# Patient Record
Sex: FEMALE | Race: WHITE | NOT HISPANIC OR LATINO | Employment: FULL TIME | URBAN - METROPOLITAN AREA
[De-identification: names, ages, dates, MRNs, and addresses within clinical notes are randomized per-mention and may not be internally consistent; named-entity substitution may affect disease eponyms.]

---

## 2017-06-05 ENCOUNTER — TELEPHONE (OUTPATIENT)
Dept: FAMILY MEDICINE | Facility: CLINIC | Age: 52
End: 2017-06-05

## 2017-06-05 ENCOUNTER — PATIENT MESSAGE (OUTPATIENT)
Dept: ADMINISTRATIVE | Facility: HOSPITAL | Age: 52
End: 2017-06-05

## 2017-06-05 DIAGNOSIS — T75.3XXA MOTION SICKNESS, INITIAL ENCOUNTER: Primary | ICD-10-CM

## 2017-06-05 NOTE — TELEPHONE ENCOUNTER
----- Message from Catarina Sandoval sent at 6/5/2017 10:37 AM CDT -----  Contact: [phone]  RX request - refill or new RX.  Is this a refill or new RX:    RX name and strength: motion sickness patch   Directions:   Is this a 30 day or 90 day RX:    Pharmacy name and phone #: [email or web address]  [phone]  Comments: going on cruise on June 18th

## 2017-06-06 RX ORDER — SCOLOPAMINE TRANSDERMAL SYSTEM 1 MG/1
1 PATCH, EXTENDED RELEASE TRANSDERMAL
Qty: 4 PATCH | Refills: 1 | Status: SHIPPED | OUTPATIENT
Start: 2017-06-06 | End: 2019-06-27

## 2017-07-03 ENCOUNTER — PATIENT MESSAGE (OUTPATIENT)
Dept: ADMINISTRATIVE | Facility: HOSPITAL | Age: 52
End: 2017-07-03

## 2017-09-27 ENCOUNTER — NURSE TRIAGE (OUTPATIENT)
Dept: ADMINISTRATIVE | Facility: CLINIC | Age: 52
End: 2017-09-27

## 2017-09-27 NOTE — TELEPHONE ENCOUNTER
Reason for Disposition   Patient wants to be seen    Protocols used: ST INSECT BITE-A-OH    Patient has a possible caterpillar or wasp sting on left hand. She states area is red with very itchy.

## 2018-01-25 ENCOUNTER — PATIENT MESSAGE (OUTPATIENT)
Dept: FAMILY MEDICINE | Facility: CLINIC | Age: 53
End: 2018-01-25

## 2018-10-04 ENCOUNTER — TELEPHONE (OUTPATIENT)
Dept: FAMILY MEDICINE | Facility: CLINIC | Age: 53
End: 2018-10-04

## 2018-10-04 NOTE — TELEPHONE ENCOUNTER
Attempted to call patient to schedule an appointment per her request via email. Left message that next available annual was in December but if patient was not feeling good we could make a urgent appointment now and schedule an annual down the line.

## 2019-04-08 ENCOUNTER — TELEPHONE (OUTPATIENT)
Dept: FAMILY MEDICINE | Facility: CLINIC | Age: 54
End: 2019-04-08

## 2019-04-08 DIAGNOSIS — Z00.00 ROUTINE GENERAL MEDICAL EXAMINATION AT A HEALTH CARE FACILITY: Primary | ICD-10-CM

## 2019-06-13 ENCOUNTER — PATIENT OUTREACH (OUTPATIENT)
Dept: ADMINISTRATIVE | Facility: HOSPITAL | Age: 54
End: 2019-06-13

## 2019-06-13 NOTE — LETTER
June 13, 2019    [facility] Records Release Dept             [facility]  [address]  [facility] [address]  Phone: [phone] June 13, 2019     Patient: Brianna Devine    YOB: 1965   Date of Visit: 6/13/2019         [facility]    We are seeing Brianna Devine in the clinic today at [facility].  Janneth Mckeon MD is their PCP.  She/He has an outstanding lab/procedure at this time when reviewing their chart.  To help with our Health Maintenance records will you please supply the following:      Mammogram Results   Pap Test Results                                       Please Fax to [facility] at [phone]    Thank you for your help,     Jayne Stanford MA  Panel Care Coordinator  [facility]  [phone] phone  [phone] fax  [email or web address]

## 2019-06-26 ENCOUNTER — LAB VISIT (OUTPATIENT)
Dept: LAB | Facility: HOSPITAL | Age: 54
End: 2019-06-26
Attending: INTERNAL MEDICINE
Payer: COMMERCIAL

## 2019-06-26 DIAGNOSIS — Z00.00 ROUTINE GENERAL MEDICAL EXAMINATION AT A HEALTH CARE FACILITY: ICD-10-CM

## 2019-06-26 LAB
ALBUMIN SERPL BCP-MCNC: 3.8 G/DL (ref 3.5–5.2)
ALP SERPL-CCNC: 75 U/L (ref 55–135)
ALT SERPL W/O P-5'-P-CCNC: 21 U/L (ref 10–44)
ANION GAP SERPL CALC-SCNC: 8 MMOL/L (ref 8–16)
AST SERPL-CCNC: 26 U/L (ref 10–40)
BASOPHILS # BLD AUTO: 0.02 K/UL (ref 0–0.2)
BASOPHILS NFR BLD: 0.3 % (ref 0–1.9)
BILIRUB SERPL-MCNC: 0.6 MG/DL (ref 0.1–1)
BUN SERPL-MCNC: 17 MG/DL (ref 6–20)
CALCIUM SERPL-MCNC: 9.7 MG/DL (ref 8.7–10.5)
CHLORIDE SERPL-SCNC: 105 MMOL/L (ref 95–110)
CHOLEST SERPL-MCNC: 185 MG/DL (ref 120–199)
CHOLEST/HDLC SERPL: 2.6 {RATIO} (ref 2–5)
CO2 SERPL-SCNC: 27 MMOL/L (ref 23–29)
CREAT SERPL-MCNC: 0.8 MG/DL (ref 0.5–1.4)
DIFFERENTIAL METHOD: ABNORMAL
EOSINOPHIL # BLD AUTO: 0.1 K/UL (ref 0–0.5)
EOSINOPHIL NFR BLD: 1.9 % (ref 0–8)
ERYTHROCYTE [DISTWIDTH] IN BLOOD BY AUTOMATED COUNT: 14.4 % (ref 11.5–14.5)
EST. GFR  (AFRICAN AMERICAN): >60 ML/MIN/1.73 M^2
EST. GFR  (NON AFRICAN AMERICAN): >60 ML/MIN/1.73 M^2
GLUCOSE SERPL-MCNC: 95 MG/DL (ref 70–110)
HCT VFR BLD AUTO: 41.2 % (ref 37–48.5)
HDLC SERPL-MCNC: 72 MG/DL (ref 40–75)
HDLC SERPL: 38.9 % (ref 20–50)
HGB BLD-MCNC: 12.9 G/DL (ref 12–16)
IMM GRANULOCYTES # BLD AUTO: 0.01 K/UL (ref 0–0.04)
IMM GRANULOCYTES NFR BLD AUTO: 0.2 % (ref 0–0.5)
LDLC SERPL CALC-MCNC: 99.8 MG/DL (ref 63–159)
LYMPHOCYTES # BLD AUTO: 2.2 K/UL (ref 1–4.8)
LYMPHOCYTES NFR BLD: 36.7 % (ref 18–48)
MCH RBC QN AUTO: 28.7 PG (ref 27–31)
MCHC RBC AUTO-ENTMCNC: 31.3 G/DL (ref 32–36)
MCV RBC AUTO: 92 FL (ref 82–98)
MONOCYTES # BLD AUTO: 0.4 K/UL (ref 0.3–1)
MONOCYTES NFR BLD: 6.1 % (ref 4–15)
NEUTROPHILS # BLD AUTO: 3.3 K/UL (ref 1.8–7.7)
NEUTROPHILS NFR BLD: 54.8 % (ref 38–73)
NONHDLC SERPL-MCNC: 113 MG/DL
NRBC BLD-RTO: 0 /100 WBC
PLATELET # BLD AUTO: 251 K/UL (ref 150–350)
PMV BLD AUTO: 10.5 FL (ref 9.2–12.9)
POTASSIUM SERPL-SCNC: 4.2 MMOL/L (ref 3.5–5.1)
PROT SERPL-MCNC: 7.3 G/DL (ref 6–8.4)
RBC # BLD AUTO: 4.5 M/UL (ref 4–5.4)
SODIUM SERPL-SCNC: 140 MMOL/L (ref 136–145)
TRIGL SERPL-MCNC: 66 MG/DL (ref 30–150)
WBC # BLD AUTO: 5.92 K/UL (ref 3.9–12.7)

## 2019-06-26 PROCEDURE — 80061 LIPID PANEL: CPT

## 2019-06-26 PROCEDURE — 85025 COMPLETE CBC W/AUTO DIFF WBC: CPT

## 2019-06-26 PROCEDURE — 36415 COLL VENOUS BLD VENIPUNCTURE: CPT | Mod: PN

## 2019-06-26 PROCEDURE — 80053 COMPREHEN METABOLIC PANEL: CPT

## 2019-06-27 ENCOUNTER — OFFICE VISIT (OUTPATIENT)
Dept: PRIMARY CARE CLINIC | Facility: CLINIC | Age: 54
End: 2019-06-27
Payer: COMMERCIAL

## 2019-06-27 VITALS
SYSTOLIC BLOOD PRESSURE: 110 MMHG | DIASTOLIC BLOOD PRESSURE: 66 MMHG | WEIGHT: 176.38 LBS | HEIGHT: 65 IN | TEMPERATURE: 98 F | HEART RATE: 73 BPM | BODY MASS INDEX: 29.38 KG/M2

## 2019-06-27 DIAGNOSIS — F41.9 ANXIETY: ICD-10-CM

## 2019-06-27 DIAGNOSIS — G47.9 DIFFICULTY SLEEPING: ICD-10-CM

## 2019-06-27 DIAGNOSIS — Z86.19 HISTORY OF SHINGLES: ICD-10-CM

## 2019-06-27 DIAGNOSIS — R23.2 HOT FLASHES: ICD-10-CM

## 2019-06-27 DIAGNOSIS — Z00.00 ANNUAL PHYSICAL EXAM: Primary | ICD-10-CM

## 2019-06-27 PROCEDURE — 99999 PR PBB SHADOW E&M-EST. PATIENT-LVL III: ICD-10-PCS | Mod: PBBFAC,,, | Performed by: INTERNAL MEDICINE

## 2019-06-27 PROCEDURE — 99396 PREV VISIT EST AGE 40-64: CPT | Mod: S$GLB,,, | Performed by: INTERNAL MEDICINE

## 2019-06-27 PROCEDURE — 99999 PR PBB SHADOW E&M-EST. PATIENT-LVL III: CPT | Mod: PBBFAC,,, | Performed by: INTERNAL MEDICINE

## 2019-06-27 PROCEDURE — 99396 PR PREVENTIVE VISIT,EST,40-64: ICD-10-PCS | Mod: S$GLB,,, | Performed by: INTERNAL MEDICINE

## 2019-06-27 RX ORDER — HYDROXYZINE HYDROCHLORIDE 25 MG/1
TABLET, FILM COATED ORAL
Qty: 30 TABLET | Refills: 5 | Status: SHIPPED | OUTPATIENT
Start: 2019-06-27 | End: 2021-10-12

## 2019-06-27 NOTE — PROGRESS NOTES
Subjective:        Patient ID: Brianna Devine is a 54 y.o. female.    Chief Complaint: Annual Exam; Shoulder Pain; and Cyst (back of right knee)    HPI   Brianna Devine presents for annual exam.    Pt had her legs ultrasounded by vascular and was told she has a cyst in the back of the right knee.    Pt does jazzercise regularly.  She noted some discomfort in the right shoulder when abducting with weights (usually uses 5#), no discomfort without weights.    Pt has not been able to lose weight despite regular exercise and trying to eat small portions, healthy diet.  She endorses having some difficulty falling asleep and staying asleep.  She takes melatonin 3mg which helps with falling asleep but she will wake up in the middle of the night and her mind will start racing thinking about everything she needs to do, work.    Review of Systems   Constitutional: Negative for activity change and unexpected weight change.   HENT: Negative for hearing loss, rhinorrhea and trouble swallowing.    Eyes: Negative for discharge and visual disturbance.   Respiratory: Negative for chest tightness, shortness of breath and wheezing.    Cardiovascular: Negative for chest pain, palpitations and leg swelling.   Gastrointestinal: Negative for abdominal pain, blood in stool, constipation, diarrhea and vomiting.   Endocrine: Negative for polydipsia and polyuria.   Genitourinary: Negative for difficulty urinating, dysuria, hematuria, menstrual problem, vaginal bleeding and vaginal discharge.   Musculoskeletal: Positive for arthralgias. Negative for joint swelling and neck pain.   Neurological: Negative for weakness and headaches.   Psychiatric/Behavioral: Positive for sleep disturbance (sometimes). Negative for confusion and dysphoric mood.           Objective:        Vitals:    06/27/19 1308   BP: 110/66   Pulse: 73   Temp: 98 °F (36.7 °C)     Physical Exam   Constitutional: She is oriented to person, place, and time. She  appears well-developed and well-nourished. No distress.   HENT:   Head: Normocephalic and atraumatic.   Right Ear: External ear normal.   Left Ear: External ear normal.   Nose: Nose normal.   - bilateral ear canals clear, tympanic membranes visualized - normal color and light reflex   Eyes: Conjunctivae and EOM are normal.   Neck: Normal range of motion. Neck supple. No thyromegaly present.   Cardiovascular: Normal rate, regular rhythm and normal heart sounds.   No murmur heard.  Pulmonary/Chest: Effort normal and breath sounds normal. No respiratory distress.   Abdominal: Soft. She exhibits no distension. There is no tenderness. There is no guarding.   Musculoskeletal: Normal range of motion. She exhibits no edema or deformity (unable to appreciate cyst or other abnormality in posterior R knee).   Lymphadenopathy:     She has no cervical adenopathy.   Neurological: She is alert and oriented to person, place, and time.   Skin: Skin is warm and dry.   Psychiatric: She has a normal mood and affect. Her behavior is normal. Judgment and thought content normal.   Vitals reviewed.        Most recent lab results reviewed with patient.    The 10-year ASCVD risk score ([place] DC Jr., et al., 2013) is: 1.2%    Values used to calculate the score:      Age: 54 years      Sex: Female      Is Non- : No      Diabetic: No      Tobacco smoker: No      Systolic Blood Pressure: 110 mmHg      Is BP treated: No      HDL Cholesterol: 72 mg/dL      Total Cholesterol: 185 mg/dL    Assessment:         1. Annual physical exam    2. History of shingles    3. Hot flashes    4. Anxiety    5. Difficulty sleeping              Plan:         Brianna was seen today for annual exam, shoulder pain and cyst.    Diagnoses and all orders for this visit:    Annual physical exam  - Recommended Shingrix vaccine.  Check coverage with insurance first, then get vaccine from local pharmacy.  - mammo, DXA ordered by pt's gyn    History of  shingles    Hot flashes: No HRT recommended given famhx of CA; advised pt there are non hormonal therapies available to treat vasomotor sx if they are severe or significantly affecting her QoL    Anxiety  Difficulty sleeping: Discussed continuing with exercise to cope with stress.  Can also try mindful breathing, yoga before bedtime to clear mind; reviewed good sleep hygiene.  Continue melatonin PRN.  Try Hydroxyzine PRN for severe anxiety and insomnia.  -     hydrOXYzine HCl (ATARAX) 25 MG tablet; Take 1/2 to 1 tab by mouth every 8 hours as needed for anxiety and difficulty sleeping.        Follow up in about 1 year (around 6/27/2020) for annual exam and labs.

## 2021-04-16 ENCOUNTER — PATIENT MESSAGE (OUTPATIENT)
Dept: RESEARCH | Facility: HOSPITAL | Age: 56
End: 2021-04-16

## 2021-09-28 ENCOUNTER — LAB VISIT (OUTPATIENT)
Dept: LAB | Facility: HOSPITAL | Age: 56
End: 2021-09-28
Attending: NURSE PRACTITIONER
Payer: COMMERCIAL

## 2021-09-28 ENCOUNTER — OFFICE VISIT (OUTPATIENT)
Dept: INTERNAL MEDICINE | Facility: CLINIC | Age: 56
End: 2021-09-28
Payer: COMMERCIAL

## 2021-09-28 VITALS
HEIGHT: 66 IN | SYSTOLIC BLOOD PRESSURE: 116 MMHG | TEMPERATURE: 98 F | WEIGHT: 173.31 LBS | BODY MASS INDEX: 27.85 KG/M2 | HEART RATE: 72 BPM | RESPIRATION RATE: 16 BRPM | DIASTOLIC BLOOD PRESSURE: 68 MMHG

## 2021-09-28 DIAGNOSIS — R52 COMPLAINTS OF TOTAL BODY PAIN: ICD-10-CM

## 2021-09-28 DIAGNOSIS — Z13.1 SCREENING FOR DIABETES MELLITUS: ICD-10-CM

## 2021-09-28 DIAGNOSIS — M25.50 ARTHRALGIA, UNSPECIFIED JOINT: Primary | ICD-10-CM

## 2021-09-28 DIAGNOSIS — D64.9 ANEMIA, UNSPECIFIED TYPE: ICD-10-CM

## 2021-09-28 DIAGNOSIS — E66.3 OVERWEIGHT (BMI 25.0-29.9): ICD-10-CM

## 2021-09-28 DIAGNOSIS — R53.83 FATIGUE, UNSPECIFIED TYPE: ICD-10-CM

## 2021-09-28 DIAGNOSIS — M25.50 ARTHRALGIA, UNSPECIFIED JOINT: ICD-10-CM

## 2021-09-28 DIAGNOSIS — M54.9 BACK PAIN, UNSPECIFIED BACK LOCATION, UNSPECIFIED BACK PAIN LATERALITY, UNSPECIFIED CHRONICITY: ICD-10-CM

## 2021-09-28 LAB
ALBUMIN SERPL BCP-MCNC: 3.7 G/DL (ref 3.5–5.2)
ALP SERPL-CCNC: 62 U/L (ref 55–135)
ALT SERPL W/O P-5'-P-CCNC: 14 U/L (ref 10–44)
ANION GAP SERPL CALC-SCNC: 14 MMOL/L (ref 8–16)
AST SERPL-CCNC: 20 U/L (ref 10–40)
BASOPHILS # BLD AUTO: 0.04 K/UL (ref 0–0.2)
BASOPHILS NFR BLD: 0.7 % (ref 0–1.9)
BILIRUB SERPL-MCNC: 0.5 MG/DL (ref 0.1–1)
BUN SERPL-MCNC: 15 MG/DL (ref 6–20)
CALCIUM SERPL-MCNC: 9.3 MG/DL (ref 8.7–10.5)
CHLORIDE SERPL-SCNC: 107 MMOL/L (ref 95–110)
CO2 SERPL-SCNC: 20 MMOL/L (ref 23–29)
CREAT SERPL-MCNC: 0.8 MG/DL (ref 0.5–1.4)
CRP SERPL-MCNC: 4.9 MG/L (ref 0–8.2)
DIFFERENTIAL METHOD: NORMAL
EOSINOPHIL # BLD AUTO: 0.1 K/UL (ref 0–0.5)
EOSINOPHIL NFR BLD: 1.9 % (ref 0–8)
ERYTHROCYTE [DISTWIDTH] IN BLOOD BY AUTOMATED COUNT: 14.1 % (ref 11.5–14.5)
ERYTHROCYTE [SEDIMENTATION RATE] IN BLOOD BY WESTERGREN METHOD: 34 MM/HR (ref 0–36)
EST. GFR  (AFRICAN AMERICAN): >60 ML/MIN/1.73 M^2
EST. GFR  (NON AFRICAN AMERICAN): >60 ML/MIN/1.73 M^2
ESTIMATED AVG GLUCOSE: 94 MG/DL (ref 68–131)
GLUCOSE SERPL-MCNC: 76 MG/DL (ref 70–110)
HBA1C MFR BLD: 4.9 % (ref 4–5.6)
HCT VFR BLD AUTO: 40.1 % (ref 37–48.5)
HGB BLD-MCNC: 13.2 G/DL (ref 12–16)
IMM GRANULOCYTES # BLD AUTO: 0.03 K/UL (ref 0–0.04)
IMM GRANULOCYTES NFR BLD AUTO: 0.5 % (ref 0–0.5)
IRON SERPL-MCNC: 126 UG/DL (ref 30–160)
LYMPHOCYTES # BLD AUTO: 1.5 K/UL (ref 1–4.8)
LYMPHOCYTES NFR BLD: 25.8 % (ref 18–48)
MCH RBC QN AUTO: 29 PG (ref 27–31)
MCHC RBC AUTO-ENTMCNC: 32.9 G/DL (ref 32–36)
MCV RBC AUTO: 88 FL (ref 82–98)
MONOCYTES # BLD AUTO: 0.4 K/UL (ref 0.3–1)
MONOCYTES NFR BLD: 6.6 % (ref 4–15)
NEUTROPHILS # BLD AUTO: 3.8 K/UL (ref 1.8–7.7)
NEUTROPHILS NFR BLD: 64.5 % (ref 38–73)
NRBC BLD-RTO: 0 /100 WBC
PLATELET # BLD AUTO: 237 K/UL (ref 150–450)
PMV BLD AUTO: 10.9 FL (ref 9.2–12.9)
POTASSIUM SERPL-SCNC: 4.3 MMOL/L (ref 3.5–5.1)
PROT SERPL-MCNC: 7 G/DL (ref 6–8.4)
RBC # BLD AUTO: 4.55 M/UL (ref 4–5.4)
RHEUMATOID FACT SERPL-ACNC: <13 IU/ML (ref 0–15)
SATURATED IRON: 36 % (ref 20–50)
SODIUM SERPL-SCNC: 141 MMOL/L (ref 136–145)
T4 FREE SERPL-MCNC: 0.87 NG/DL (ref 0.71–1.51)
TOTAL IRON BINDING CAPACITY: 349 UG/DL (ref 250–450)
TRANSFERRIN SERPL-MCNC: 236 MG/DL (ref 200–375)
TSH SERPL DL<=0.005 MIU/L-ACNC: 2.62 UIU/ML (ref 0.4–4)
WBC # BLD AUTO: 5.9 K/UL (ref 3.9–12.7)

## 2021-09-28 PROCEDURE — 84439 ASSAY OF FREE THYROXINE: CPT | Performed by: NURSE PRACTITIONER

## 2021-09-28 PROCEDURE — 3044F PR MOST RECENT HEMOGLOBIN A1C LEVEL <7.0%: ICD-10-PCS | Mod: CPTII,S$GLB,, | Performed by: NURSE PRACTITIONER

## 2021-09-28 PROCEDURE — 36415 COLL VENOUS BLD VENIPUNCTURE: CPT | Mod: PO | Performed by: NURSE PRACTITIONER

## 2021-09-28 PROCEDURE — 86038 ANTINUCLEAR ANTIBODIES: CPT | Performed by: NURSE PRACTITIONER

## 2021-09-28 PROCEDURE — 86431 RHEUMATOID FACTOR QUANT: CPT | Performed by: NURSE PRACTITIONER

## 2021-09-28 PROCEDURE — 86140 C-REACTIVE PROTEIN: CPT | Performed by: NURSE PRACTITIONER

## 2021-09-28 PROCEDURE — 3078F DIAST BP <80 MM HG: CPT | Mod: CPTII,S$GLB,, | Performed by: NURSE PRACTITIONER

## 2021-09-28 PROCEDURE — 3074F PR MOST RECENT SYSTOLIC BLOOD PRESSURE < 130 MM HG: ICD-10-PCS | Mod: CPTII,S$GLB,, | Performed by: NURSE PRACTITIONER

## 2021-09-28 PROCEDURE — 3008F PR BODY MASS INDEX (BMI) DOCUMENTED: ICD-10-PCS | Mod: CPTII,S$GLB,, | Performed by: NURSE PRACTITIONER

## 2021-09-28 PROCEDURE — 86235 NUCLEAR ANTIGEN ANTIBODY: CPT | Mod: 59 | Performed by: NURSE PRACTITIONER

## 2021-09-28 PROCEDURE — 99214 OFFICE O/P EST MOD 30 MIN: CPT | Mod: S$GLB,,, | Performed by: NURSE PRACTITIONER

## 2021-09-28 PROCEDURE — 99214 PR OFFICE/OUTPT VISIT, EST, LEVL IV, 30-39 MIN: ICD-10-PCS | Mod: S$GLB,,, | Performed by: NURSE PRACTITIONER

## 2021-09-28 PROCEDURE — 84466 ASSAY OF TRANSFERRIN: CPT | Performed by: NURSE PRACTITIONER

## 2021-09-28 PROCEDURE — 3078F PR MOST RECENT DIASTOLIC BLOOD PRESSURE < 80 MM HG: ICD-10-PCS | Mod: CPTII,S$GLB,, | Performed by: NURSE PRACTITIONER

## 2021-09-28 PROCEDURE — 3008F BODY MASS INDEX DOCD: CPT | Mod: CPTII,S$GLB,, | Performed by: NURSE PRACTITIONER

## 2021-09-28 PROCEDURE — 3044F HG A1C LEVEL LT 7.0%: CPT | Mod: CPTII,S$GLB,, | Performed by: NURSE PRACTITIONER

## 2021-09-28 PROCEDURE — 86039 ANTINUCLEAR ANTIBODIES (ANA): CPT | Performed by: NURSE PRACTITIONER

## 2021-09-28 PROCEDURE — 3074F SYST BP LT 130 MM HG: CPT | Mod: CPTII,S$GLB,, | Performed by: NURSE PRACTITIONER

## 2021-09-28 PROCEDURE — 99999 PR PBB SHADOW E&M-EST. PATIENT-LVL III: CPT | Mod: PBBFAC,,, | Performed by: NURSE PRACTITIONER

## 2021-09-28 PROCEDURE — 85025 COMPLETE CBC W/AUTO DIFF WBC: CPT | Performed by: NURSE PRACTITIONER

## 2021-09-28 PROCEDURE — 99999 PR PBB SHADOW E&M-EST. PATIENT-LVL III: ICD-10-PCS | Mod: PBBFAC,,, | Performed by: NURSE PRACTITIONER

## 2021-09-28 PROCEDURE — 80053 COMPREHEN METABOLIC PANEL: CPT | Performed by: NURSE PRACTITIONER

## 2021-09-28 PROCEDURE — 83036 HEMOGLOBIN GLYCOSYLATED A1C: CPT | Performed by: NURSE PRACTITIONER

## 2021-09-28 PROCEDURE — 84443 ASSAY THYROID STIM HORMONE: CPT | Performed by: NURSE PRACTITIONER

## 2021-09-28 PROCEDURE — 85652 RBC SED RATE AUTOMATED: CPT | Performed by: NURSE PRACTITIONER

## 2021-09-29 LAB
ANA PATTERN 1: NORMAL
ANA SER QL IF: POSITIVE
ANA TITR SER IF: NORMAL {TITER}

## 2021-09-30 ENCOUNTER — PATIENT MESSAGE (OUTPATIENT)
Dept: INTERNAL MEDICINE | Facility: CLINIC | Age: 56
End: 2021-09-30

## 2021-10-01 ENCOUNTER — TELEPHONE (OUTPATIENT)
Dept: INTERNAL MEDICINE | Facility: CLINIC | Age: 56
End: 2021-10-01

## 2021-10-01 LAB
ANTI SM ANTIBODY: 0.08 RATIO (ref 0–0.99)
ANTI SM/RNP ANTIBODY: 0.08 RATIO (ref 0–0.99)
ANTI-SM INTERPRETATION: NEGATIVE
ANTI-SM/RNP INTERPRETATION: NEGATIVE
ANTI-SSA ANTIBODY: 0.06 RATIO (ref 0–0.99)
ANTI-SSA INTERPRETATION: NEGATIVE
ANTI-SSB ANTIBODY: 0.06 RATIO (ref 0–0.99)
ANTI-SSB INTERPRETATION: NEGATIVE
DSDNA AB SER-ACNC: NORMAL [IU]/ML

## 2021-10-02 ENCOUNTER — PATIENT MESSAGE (OUTPATIENT)
Dept: INTERNAL MEDICINE | Facility: CLINIC | Age: 56
End: 2021-10-02

## 2021-10-02 DIAGNOSIS — R53.83 FATIGUE, UNSPECIFIED TYPE: ICD-10-CM

## 2021-10-02 DIAGNOSIS — M25.50 ARTHRALGIA, UNSPECIFIED JOINT: ICD-10-CM

## 2021-10-02 DIAGNOSIS — R52 COMPLAINTS OF TOTAL BODY PAIN: Primary | ICD-10-CM

## 2021-10-12 ENCOUNTER — OFFICE VISIT (OUTPATIENT)
Dept: RHEUMATOLOGY | Facility: CLINIC | Age: 56
End: 2021-10-12
Payer: COMMERCIAL

## 2021-10-12 ENCOUNTER — HOSPITAL ENCOUNTER (OUTPATIENT)
Dept: RADIOLOGY | Facility: HOSPITAL | Age: 56
Discharge: HOME | End: 2021-10-12
Attending: INTERNAL MEDICINE
Payer: COMMERCIAL

## 2021-10-12 VITALS
WEIGHT: 176.56 LBS | HEART RATE: 76 BPM | BODY MASS INDEX: 28.5 KG/M2 | SYSTOLIC BLOOD PRESSURE: 121 MMHG | DIASTOLIC BLOOD PRESSURE: 71 MMHG

## 2021-10-12 DIAGNOSIS — R53.83 FATIGUE, UNSPECIFIED TYPE: ICD-10-CM

## 2021-10-12 DIAGNOSIS — M79.7 FIBROMYALGIA: Primary | ICD-10-CM

## 2021-10-12 DIAGNOSIS — M25.50 ARTHRALGIA, UNSPECIFIED JOINT: ICD-10-CM

## 2021-10-12 DIAGNOSIS — R52 COMPLAINTS OF TOTAL BODY PAIN: ICD-10-CM

## 2021-10-12 PROCEDURE — 1159F PR MEDICATION LIST DOCUMENTED IN MEDICAL RECORD: ICD-10-PCS | Mod: CPTII,S$GLB,, | Performed by: INTERNAL MEDICINE

## 2021-10-12 PROCEDURE — 99999 PR PBB SHADOW E&M-EST. PATIENT-LVL III: CPT | Mod: PBBFAC,,, | Performed by: INTERNAL MEDICINE

## 2021-10-12 PROCEDURE — 3074F SYST BP LT 130 MM HG: CPT | Mod: CPTII,S$GLB,, | Performed by: INTERNAL MEDICINE

## 2021-10-12 PROCEDURE — 3044F HG A1C LEVEL LT 7.0%: CPT | Mod: CPTII,S$GLB,, | Performed by: INTERNAL MEDICINE

## 2021-10-12 PROCEDURE — 99999 PR PBB SHADOW E&M-EST. PATIENT-LVL III: ICD-10-PCS | Mod: PBBFAC,,, | Performed by: INTERNAL MEDICINE

## 2021-10-12 PROCEDURE — 1159F MED LIST DOCD IN RCRD: CPT | Mod: CPTII,S$GLB,, | Performed by: INTERNAL MEDICINE

## 2021-10-12 PROCEDURE — 3008F BODY MASS INDEX DOCD: CPT | Mod: CPTII,S$GLB,, | Performed by: INTERNAL MEDICINE

## 2021-10-12 PROCEDURE — 3044F PR MOST RECENT HEMOGLOBIN A1C LEVEL <7.0%: ICD-10-PCS | Mod: CPTII,S$GLB,, | Performed by: INTERNAL MEDICINE

## 2021-10-12 PROCEDURE — 3078F PR MOST RECENT DIASTOLIC BLOOD PRESSURE < 80 MM HG: ICD-10-PCS | Mod: CPTII,S$GLB,, | Performed by: INTERNAL MEDICINE

## 2021-10-12 PROCEDURE — 73130 X-RAY EXAM OF HAND: CPT | Mod: TC,50

## 2021-10-12 PROCEDURE — 99205 PR OFFICE/OUTPT VISIT, NEW, LEVL V, 60-74 MIN: ICD-10-PCS | Mod: S$GLB,,, | Performed by: INTERNAL MEDICINE

## 2021-10-12 PROCEDURE — 73562 X-RAY EXAM OF KNEE 3: CPT | Mod: 26,50,, | Performed by: RADIOLOGY

## 2021-10-12 PROCEDURE — 73130 X-RAY EXAM OF HAND: CPT | Mod: 26,50,, | Performed by: RADIOLOGY

## 2021-10-12 PROCEDURE — 73130 XR HAND COMPLETE 3 VIEWS BILATERAL: ICD-10-PCS | Mod: 26,50,, | Performed by: RADIOLOGY

## 2021-10-12 PROCEDURE — 99205 OFFICE O/P NEW HI 60 MIN: CPT | Mod: S$GLB,,, | Performed by: INTERNAL MEDICINE

## 2021-10-12 PROCEDURE — 73562 XR KNEE 3 VIEW BILATERAL: ICD-10-PCS | Mod: 26,50,, | Performed by: RADIOLOGY

## 2021-10-12 PROCEDURE — 3078F DIAST BP <80 MM HG: CPT | Mod: CPTII,S$GLB,, | Performed by: INTERNAL MEDICINE

## 2021-10-12 PROCEDURE — 73562 X-RAY EXAM OF KNEE 3: CPT | Mod: TC,50

## 2021-10-12 PROCEDURE — 3074F PR MOST RECENT SYSTOLIC BLOOD PRESSURE < 130 MM HG: ICD-10-PCS | Mod: CPTII,S$GLB,, | Performed by: INTERNAL MEDICINE

## 2021-10-12 PROCEDURE — 3008F PR BODY MASS INDEX (BMI) DOCUMENTED: ICD-10-PCS | Mod: CPTII,S$GLB,, | Performed by: INTERNAL MEDICINE

## 2021-10-12 RX ORDER — VALACYCLOVIR HYDROCHLORIDE 1 G/1
1 TABLET, FILM COATED ORAL
COMMUNITY
Start: 2020-11-30

## 2021-10-12 RX ORDER — DULOXETIN HYDROCHLORIDE 30 MG/1
30 CAPSULE, DELAYED RELEASE ORAL DAILY
Qty: 30 CAPSULE | Refills: 5 | Status: SHIPPED | OUTPATIENT
Start: 2021-10-12 | End: 2021-12-08

## 2021-11-02 DIAGNOSIS — M79.7 FIBROMYALGIA: Primary | ICD-10-CM

## 2021-11-05 ENCOUNTER — CLINICAL SUPPORT (OUTPATIENT)
Dept: REHABILITATION | Facility: HOSPITAL | Age: 56
End: 2021-11-05
Payer: COMMERCIAL

## 2021-11-05 DIAGNOSIS — M25.511 BILATERAL SHOULDER PAIN, UNSPECIFIED CHRONICITY: ICD-10-CM

## 2021-11-05 DIAGNOSIS — R29.898 DECREASED STRENGTH OF LOWER EXTREMITY: ICD-10-CM

## 2021-11-05 DIAGNOSIS — M54.2 NECK PAIN: Primary | ICD-10-CM

## 2021-11-05 DIAGNOSIS — M25.512 BILATERAL SHOULDER PAIN, UNSPECIFIED CHRONICITY: ICD-10-CM

## 2021-11-05 DIAGNOSIS — M79.7 FIBROMYALGIA: ICD-10-CM

## 2021-11-05 PROCEDURE — 97161 PT EVAL LOW COMPLEX 20 MIN: CPT

## 2021-11-09 ENCOUNTER — CLINICAL SUPPORT (OUTPATIENT)
Dept: REHABILITATION | Facility: HOSPITAL | Age: 56
End: 2021-11-09
Payer: COMMERCIAL

## 2021-11-09 DIAGNOSIS — M25.511 BILATERAL SHOULDER PAIN, UNSPECIFIED CHRONICITY: ICD-10-CM

## 2021-11-09 DIAGNOSIS — R29.898 DECREASED STRENGTH OF UPPER EXTREMITY: ICD-10-CM

## 2021-11-09 DIAGNOSIS — M25.512 BILATERAL SHOULDER PAIN, UNSPECIFIED CHRONICITY: ICD-10-CM

## 2021-11-09 PROCEDURE — 97110 THERAPEUTIC EXERCISES: CPT

## 2021-11-09 PROCEDURE — 97140 MANUAL THERAPY 1/> REGIONS: CPT

## 2021-11-10 PROBLEM — M25.512 SHOULDER PAIN, BILATERAL: Status: ACTIVE | Noted: 2021-11-10

## 2021-11-10 PROBLEM — M25.511 SHOULDER PAIN, BILATERAL: Status: ACTIVE | Noted: 2021-11-10

## 2021-11-10 PROBLEM — R29.898 DECREASED STRENGTH OF LOWER EXTREMITY: Status: ACTIVE | Noted: 2021-11-10

## 2021-11-11 ENCOUNTER — CLINICAL SUPPORT (OUTPATIENT)
Dept: REHABILITATION | Facility: HOSPITAL | Age: 56
End: 2021-11-11
Payer: COMMERCIAL

## 2021-11-11 DIAGNOSIS — R29.898 DECREASED STRENGTH OF UPPER EXTREMITY: ICD-10-CM

## 2021-11-11 DIAGNOSIS — M25.512 BILATERAL SHOULDER PAIN, UNSPECIFIED CHRONICITY: ICD-10-CM

## 2021-11-11 DIAGNOSIS — M25.511 BILATERAL SHOULDER PAIN, UNSPECIFIED CHRONICITY: ICD-10-CM

## 2021-11-11 PROCEDURE — 97140 MANUAL THERAPY 1/> REGIONS: CPT

## 2021-11-11 PROCEDURE — 97110 THERAPEUTIC EXERCISES: CPT

## 2021-11-16 ENCOUNTER — CLINICAL SUPPORT (OUTPATIENT)
Dept: REHABILITATION | Facility: HOSPITAL | Age: 56
End: 2021-11-16
Payer: COMMERCIAL

## 2021-11-16 DIAGNOSIS — R29.898 DECREASED STRENGTH OF UPPER EXTREMITY: ICD-10-CM

## 2021-11-16 DIAGNOSIS — M25.512 BILATERAL SHOULDER PAIN, UNSPECIFIED CHRONICITY: ICD-10-CM

## 2021-11-16 DIAGNOSIS — M25.511 BILATERAL SHOULDER PAIN, UNSPECIFIED CHRONICITY: ICD-10-CM

## 2021-11-16 PROCEDURE — 97110 THERAPEUTIC EXERCISES: CPT | Mod: CQ

## 2021-11-16 PROCEDURE — 97140 MANUAL THERAPY 1/> REGIONS: CPT | Mod: CQ

## 2021-11-19 ENCOUNTER — CLINICAL SUPPORT (OUTPATIENT)
Dept: REHABILITATION | Facility: HOSPITAL | Age: 56
End: 2021-11-19
Payer: COMMERCIAL

## 2021-11-19 DIAGNOSIS — M25.512 BILATERAL SHOULDER PAIN, UNSPECIFIED CHRONICITY: ICD-10-CM

## 2021-11-19 DIAGNOSIS — R29.898 DECREASED STRENGTH OF UPPER EXTREMITY: ICD-10-CM

## 2021-11-19 DIAGNOSIS — M25.511 BILATERAL SHOULDER PAIN, UNSPECIFIED CHRONICITY: ICD-10-CM

## 2021-11-19 PROCEDURE — 97140 MANUAL THERAPY 1/> REGIONS: CPT | Mod: CQ

## 2021-11-19 PROCEDURE — 97110 THERAPEUTIC EXERCISES: CPT | Mod: CQ

## 2021-11-23 ENCOUNTER — CLINICAL SUPPORT (OUTPATIENT)
Dept: REHABILITATION | Facility: HOSPITAL | Age: 56
End: 2021-11-23
Payer: COMMERCIAL

## 2021-11-23 DIAGNOSIS — M25.512 BILATERAL SHOULDER PAIN, UNSPECIFIED CHRONICITY: ICD-10-CM

## 2021-11-23 DIAGNOSIS — R29.898 DECREASED STRENGTH OF UPPER EXTREMITY: ICD-10-CM

## 2021-11-23 DIAGNOSIS — M25.511 BILATERAL SHOULDER PAIN, UNSPECIFIED CHRONICITY: ICD-10-CM

## 2021-11-23 PROCEDURE — 97110 THERAPEUTIC EXERCISES: CPT

## 2021-11-23 PROCEDURE — 97140 MANUAL THERAPY 1/> REGIONS: CPT

## 2021-11-29 ENCOUNTER — CLINICAL SUPPORT (OUTPATIENT)
Dept: REHABILITATION | Facility: HOSPITAL | Age: 56
End: 2021-11-29
Payer: COMMERCIAL

## 2021-11-29 DIAGNOSIS — M25.511 BILATERAL SHOULDER PAIN, UNSPECIFIED CHRONICITY: ICD-10-CM

## 2021-11-29 DIAGNOSIS — M25.512 BILATERAL SHOULDER PAIN, UNSPECIFIED CHRONICITY: ICD-10-CM

## 2021-11-29 DIAGNOSIS — R29.898 DECREASED STRENGTH OF UPPER EXTREMITY: ICD-10-CM

## 2021-11-29 PROCEDURE — 97110 THERAPEUTIC EXERCISES: CPT | Mod: CQ

## 2021-12-02 ENCOUNTER — TELEPHONE (OUTPATIENT)
Dept: ORTHOPEDICS | Facility: CLINIC | Age: 56
End: 2021-12-02
Payer: COMMERCIAL

## 2021-12-02 ENCOUNTER — PATIENT MESSAGE (OUTPATIENT)
Dept: ORTHOPEDICS | Facility: CLINIC | Age: 56
End: 2021-12-02
Payer: COMMERCIAL

## 2021-12-02 DIAGNOSIS — M50.30 DDD (DEGENERATIVE DISC DISEASE), CERVICAL: Primary | ICD-10-CM

## 2021-12-03 ENCOUNTER — CLINICAL SUPPORT (OUTPATIENT)
Dept: REHABILITATION | Facility: HOSPITAL | Age: 56
End: 2021-12-03
Payer: COMMERCIAL

## 2021-12-03 DIAGNOSIS — M25.511 BILATERAL SHOULDER PAIN, UNSPECIFIED CHRONICITY: ICD-10-CM

## 2021-12-03 DIAGNOSIS — M25.512 BILATERAL SHOULDER PAIN, UNSPECIFIED CHRONICITY: ICD-10-CM

## 2021-12-03 DIAGNOSIS — R29.898 DECREASED STRENGTH OF UPPER EXTREMITY: ICD-10-CM

## 2021-12-03 PROCEDURE — 97110 THERAPEUTIC EXERCISES: CPT | Mod: CQ

## 2021-12-08 ENCOUNTER — CLINICAL SUPPORT (OUTPATIENT)
Dept: REHABILITATION | Facility: HOSPITAL | Age: 56
End: 2021-12-08
Payer: COMMERCIAL

## 2021-12-08 ENCOUNTER — HOSPITAL ENCOUNTER (OUTPATIENT)
Dept: RADIOLOGY | Facility: HOSPITAL | Age: 56
Discharge: HOME | End: 2021-12-08
Attending: PHYSICIAN ASSISTANT
Payer: COMMERCIAL

## 2021-12-08 ENCOUNTER — OFFICE VISIT (OUTPATIENT)
Dept: ORTHOPEDICS | Facility: CLINIC | Age: 56
End: 2021-12-08
Payer: COMMERCIAL

## 2021-12-08 VITALS — HEIGHT: 66 IN | WEIGHT: 0.75 LBS | BODY MASS INDEX: 0.12 KG/M2

## 2021-12-08 DIAGNOSIS — M50.30 DDD (DEGENERATIVE DISC DISEASE), CERVICAL: Primary | ICD-10-CM

## 2021-12-08 DIAGNOSIS — M50.30 DDD (DEGENERATIVE DISC DISEASE), CERVICAL: ICD-10-CM

## 2021-12-08 DIAGNOSIS — M54.2 NECK PAIN: ICD-10-CM

## 2021-12-08 DIAGNOSIS — M54.12 CERVICAL RADICULOPATHY: ICD-10-CM

## 2021-12-08 DIAGNOSIS — M25.512 BILATERAL SHOULDER PAIN, UNSPECIFIED CHRONICITY: ICD-10-CM

## 2021-12-08 DIAGNOSIS — M25.511 BILATERAL SHOULDER PAIN, UNSPECIFIED CHRONICITY: ICD-10-CM

## 2021-12-08 DIAGNOSIS — R29.898 DECREASED STRENGTH OF UPPER EXTREMITY: ICD-10-CM

## 2021-12-08 PROCEDURE — 97140 MANUAL THERAPY 1/> REGIONS: CPT

## 2021-12-08 PROCEDURE — 97110 THERAPEUTIC EXERCISES: CPT

## 2021-12-08 PROCEDURE — 72050 X-RAY EXAM NECK SPINE 4/5VWS: CPT | Mod: TC

## 2021-12-08 PROCEDURE — 99999 PR PBB SHADOW E&M-EST. PATIENT-LVL II: CPT | Mod: PBBFAC,,, | Performed by: PHYSICIAN ASSISTANT

## 2021-12-08 PROCEDURE — 99204 OFFICE O/P NEW MOD 45 MIN: CPT | Mod: S$GLB,,, | Performed by: PHYSICIAN ASSISTANT

## 2021-12-08 PROCEDURE — 99999 PR PBB SHADOW E&M-EST. PATIENT-LVL II: ICD-10-PCS | Mod: PBBFAC,,, | Performed by: PHYSICIAN ASSISTANT

## 2021-12-08 PROCEDURE — 72050 X-RAY EXAM NECK SPINE 4/5VWS: CPT | Mod: 26,,, | Performed by: RADIOLOGY

## 2021-12-08 PROCEDURE — 99204 PR OFFICE/OUTPT VISIT, NEW, LEVL IV, 45-59 MIN: ICD-10-PCS | Mod: S$GLB,,, | Performed by: PHYSICIAN ASSISTANT

## 2021-12-08 PROCEDURE — 72050 XR CERVICAL SPINE AP LAT WITH FLEX EXTEN: ICD-10-PCS | Mod: 26,,, | Performed by: RADIOLOGY

## 2021-12-08 RX ORDER — METHOCARBAMOL 750 MG/1
750 TABLET, FILM COATED ORAL 3 TIMES DAILY
Qty: 60 TABLET | Refills: 0 | Status: SHIPPED | OUTPATIENT
Start: 2021-12-08 | End: 2021-12-28

## 2021-12-10 ENCOUNTER — CLINICAL SUPPORT (OUTPATIENT)
Dept: REHABILITATION | Facility: HOSPITAL | Age: 56
End: 2021-12-10
Payer: COMMERCIAL

## 2021-12-10 DIAGNOSIS — M25.511 BILATERAL SHOULDER PAIN, UNSPECIFIED CHRONICITY: ICD-10-CM

## 2021-12-10 DIAGNOSIS — R29.898 DECREASED STRENGTH OF UPPER EXTREMITY: ICD-10-CM

## 2021-12-10 DIAGNOSIS — M25.512 BILATERAL SHOULDER PAIN, UNSPECIFIED CHRONICITY: ICD-10-CM

## 2021-12-10 PROCEDURE — 97140 MANUAL THERAPY 1/> REGIONS: CPT

## 2021-12-10 PROCEDURE — 97110 THERAPEUTIC EXERCISES: CPT

## 2021-12-20 ENCOUNTER — DOCUMENTATION ONLY (OUTPATIENT)
Dept: REHABILITATION | Facility: HOSPITAL | Age: 56
End: 2021-12-20

## 2021-12-21 ENCOUNTER — HOSPITAL ENCOUNTER (OUTPATIENT)
Dept: RADIOLOGY | Facility: HOSPITAL | Age: 56
Discharge: HOME | End: 2021-12-21
Attending: PHYSICIAN ASSISTANT
Payer: COMMERCIAL

## 2021-12-21 DIAGNOSIS — M54.12 CERVICAL RADICULOPATHY: ICD-10-CM

## 2021-12-21 DIAGNOSIS — M54.2 NECK PAIN: ICD-10-CM

## 2021-12-21 DIAGNOSIS — M50.30 DDD (DEGENERATIVE DISC DISEASE), CERVICAL: ICD-10-CM

## 2021-12-21 PROCEDURE — 72141 MRI NECK SPINE W/O DYE: CPT | Mod: 26,,, | Performed by: RADIOLOGY

## 2021-12-21 PROCEDURE — 72141 MRI NECK SPINE W/O DYE: CPT | Mod: TC

## 2021-12-21 PROCEDURE — 72141 MRI CERVICAL SPINE WITHOUT CONTRAST: ICD-10-PCS | Mod: 26,,, | Performed by: RADIOLOGY

## 2021-12-22 ENCOUNTER — CLINICAL SUPPORT (OUTPATIENT)
Dept: REHABILITATION | Facility: HOSPITAL | Age: 56
End: 2021-12-22
Payer: COMMERCIAL

## 2021-12-22 DIAGNOSIS — M25.512 BILATERAL SHOULDER PAIN, UNSPECIFIED CHRONICITY: ICD-10-CM

## 2021-12-22 DIAGNOSIS — R29.898 DECREASED STRENGTH OF UPPER EXTREMITY: ICD-10-CM

## 2021-12-22 DIAGNOSIS — M25.511 BILATERAL SHOULDER PAIN, UNSPECIFIED CHRONICITY: ICD-10-CM

## 2021-12-22 PROCEDURE — 97012 MECHANICAL TRACTION THERAPY: CPT

## 2021-12-22 PROCEDURE — 97110 THERAPEUTIC EXERCISES: CPT

## 2021-12-28 ENCOUNTER — CLINICAL SUPPORT (OUTPATIENT)
Dept: REHABILITATION | Facility: HOSPITAL | Age: 56
End: 2021-12-28
Payer: COMMERCIAL

## 2021-12-28 DIAGNOSIS — M25.512 BILATERAL SHOULDER PAIN, UNSPECIFIED CHRONICITY: ICD-10-CM

## 2021-12-28 DIAGNOSIS — R29.898 DECREASED STRENGTH OF UPPER EXTREMITY: ICD-10-CM

## 2021-12-28 DIAGNOSIS — M25.511 BILATERAL SHOULDER PAIN, UNSPECIFIED CHRONICITY: ICD-10-CM

## 2021-12-28 PROCEDURE — 97110 THERAPEUTIC EXERCISES: CPT

## 2021-12-28 PROCEDURE — 97012 MECHANICAL TRACTION THERAPY: CPT

## 2021-12-28 PROCEDURE — 97140 MANUAL THERAPY 1/> REGIONS: CPT | Mod: 59

## 2022-01-03 ENCOUNTER — OFFICE VISIT (OUTPATIENT)
Dept: ORTHOPEDICS | Facility: CLINIC | Age: 57
End: 2022-01-03
Payer: COMMERCIAL

## 2022-01-03 VITALS — HEIGHT: 66 IN | WEIGHT: 176 LBS | BODY MASS INDEX: 28.28 KG/M2

## 2022-01-03 DIAGNOSIS — M54.12 CERVICAL RADICULOPATHY: Primary | ICD-10-CM

## 2022-01-03 PROCEDURE — 99999 PR PBB SHADOW E&M-EST. PATIENT-LVL II: ICD-10-PCS | Mod: PBBFAC,,, | Performed by: PHYSICIAN ASSISTANT

## 2022-01-03 PROCEDURE — 1159F MED LIST DOCD IN RCRD: CPT | Mod: CPTII,S$GLB,, | Performed by: PHYSICIAN ASSISTANT

## 2022-01-03 PROCEDURE — 99213 PR OFFICE/OUTPT VISIT, EST, LEVL III, 20-29 MIN: ICD-10-PCS | Mod: S$GLB,,, | Performed by: PHYSICIAN ASSISTANT

## 2022-01-03 PROCEDURE — 3008F BODY MASS INDEX DOCD: CPT | Mod: CPTII,S$GLB,, | Performed by: PHYSICIAN ASSISTANT

## 2022-01-03 PROCEDURE — 3008F PR BODY MASS INDEX (BMI) DOCUMENTED: ICD-10-PCS | Mod: CPTII,S$GLB,, | Performed by: PHYSICIAN ASSISTANT

## 2022-01-03 PROCEDURE — 99999 PR PBB SHADOW E&M-EST. PATIENT-LVL II: CPT | Mod: PBBFAC,,, | Performed by: PHYSICIAN ASSISTANT

## 2022-01-03 PROCEDURE — 99213 OFFICE O/P EST LOW 20 MIN: CPT | Mod: S$GLB,,, | Performed by: PHYSICIAN ASSISTANT

## 2022-01-03 PROCEDURE — 1159F PR MEDICATION LIST DOCUMENTED IN MEDICAL RECORD: ICD-10-PCS | Mod: CPTII,S$GLB,, | Performed by: PHYSICIAN ASSISTANT

## 2022-01-03 PROCEDURE — 1160F PR REVIEW ALL MEDS BY PRESCRIBER/CLIN PHARMACIST DOCUMENTED: ICD-10-PCS | Mod: CPTII,S$GLB,, | Performed by: PHYSICIAN ASSISTANT

## 2022-01-03 PROCEDURE — 1160F RVW MEDS BY RX/DR IN RCRD: CPT | Mod: CPTII,S$GLB,, | Performed by: PHYSICIAN ASSISTANT

## 2022-01-03 NOTE — PROGRESS NOTES
"DATE: 1/3/2022  PATIENT: Brianna Devine    Attending Physician: Ronaldo Taylor M.D.    HISTORY:  Brianna Devine is a 56 y.o. female who returns to me today for MRI results.  She was last seen by me 12/8/2021.  Today she is doing well but notes left arm pain is still present at 4/10. She states PT has improved her pain and strength.     The Patient denies myelopathic symptoms such as handwriting changes or difficulty with buttons/coins/keys. Denies perineal paresthesias, bowel/bladder dysfunction.    PMH/PSH/FamHx/SocHx:  Unchanged from prior visit    ROS:  REVIEW OF SYSTEMS:  Constitution: Negative. Negative for chills, fever and night sweats.   HENT: Negative for congestion and headaches.    Eyes: Negative for blurred vision, left vision loss and right vision loss.   Cardiovascular: Negative for chest pain and syncope.   Respiratory: Negative for cough and shortness of breath.    Endocrine: Negative for polydipsia, polyphagia and polyuria.   Hematologic/Lymphatic: Negative for bleeding problem. Does not bruise/bleed easily.   Skin: Negative for dry skin, itching and rash.   Musculoskeletal: Negative for falls and muscle weakness.   Gastrointestinal: Negative for abdominal pain and bowel incontinence.   Allergic/Immunologic: Negative for hives and persistent infections.  Genitourinary: Negative for urinary retention/incontinence and nocturia.   Neurological: negative for disturbances in coordination, no myelopathic symptoms such as handwriting changes or difficulty with buttons, coins, keys or small objects. No loss of balance and seizures.   Psychiatric/Behavioral: Negative for depression. The patient does not have insomnia.   Denies myelopathic symptoms, perineal paresthesias, bowel or bladder incontinence    EXAM:  Ht 5' 6" (1.676 m)   Wt 79.8 kg (176 lb)   BMI 28.41 kg/m²     Physical exam stable. Neuro exam stable.     IMAGING:    Today I personally re-reviewed AP, Lat and Flex/Ex  " upright C-spine films that demonstrate multilevel disc degeneration and grade I spondylolisthesis at C2/3 and C3/4.    MRI cervical spine demonstrates no significant spinal canal or foraminal stenosis.     Body mass index is 28.41 kg/m².    Hemoglobin A1C   Date Value Ref Range Status   09/28/2021 4.9 4.0 - 5.6 % Final     Comment:     ADA Screening Guidelines:  5.7-6.4%  Consistent with prediabetes  >or=6.5%  Consistent with diabetes    High levels of fetal hemoglobin interfere with the HbA1C  assay. Heterozygous hemoglobin variants (HbS, HgC, etc)do  not significantly interfere with this assay.   However, presence of multiple variants may affect accuracy.           ASSESSMENT/PLAN:    Brianna was seen today for follow-up.    Diagnoses and all orders for this visit:    Cervical radiculopathy      Today we discussed at length all of the different treatment options including anti-inflammatories, acetaminophen, rest, ice, heat, physical therapy including strengthening and stretching exercises, home exercises, ROM, aerobic conditioning, aqua therapy, other modalities including ultrasound, massage, and dry needling, epidural steroid injections and finally surgical intervention.      We discussed RAVIN injections but the patient would like to continue PT and think about injections for now. Patient will follow up if she changes her mind.

## 2022-01-04 ENCOUNTER — CLINICAL SUPPORT (OUTPATIENT)
Dept: REHABILITATION | Facility: HOSPITAL | Age: 57
End: 2022-01-04
Payer: COMMERCIAL

## 2022-01-04 DIAGNOSIS — R29.898 DECREASED STRENGTH OF UPPER EXTREMITY: ICD-10-CM

## 2022-01-04 DIAGNOSIS — M25.512 BILATERAL SHOULDER PAIN, UNSPECIFIED CHRONICITY: ICD-10-CM

## 2022-01-04 DIAGNOSIS — M25.511 BILATERAL SHOULDER PAIN, UNSPECIFIED CHRONICITY: ICD-10-CM

## 2022-01-04 PROCEDURE — 97012 MECHANICAL TRACTION THERAPY: CPT

## 2022-01-04 PROCEDURE — 97110 THERAPEUTIC EXERCISES: CPT

## 2022-01-05 NOTE — PLAN OF CARE
"[facility]  Physical Therapy Plan of Care Note    Name: Brianna Goyal [facility]  Clinic Number: [id number]    Therapy Diagnosis:   Encounter Diagnoses   Name Primary?    Bilateral shoulder pain, unspecified chronicity     Decreased strength of upper extremity        Physician: Hola Curiel*    Visit Date: 1/4/2022    Physician Orders: PT Eval and Treat   Medical Diagnosis from Referral: M79.7 (ICD-10-CM) - Fibromyalgia   Evaluation Date: 1/4/2022  Authorization Period Expiration: 1/1/2023  Plan of Care Expiration: 3/4/2022  Progress Note Due: 10th visit  Visit # / Visits authorized: 1/ 1    Precautions: Standard  Functional Level Prior to Evaluation:  Chronic left shoulder pain secondary to cervical radiculopathy, upper extremity weakness    Time in: 2:00 pm  Time out: 3:00 pm  Total Billable time: 60 minutes    SUBJECTIVE     Update:  Patient reports: that PT has made her "feel like night and day." Patient notes that she has not kept with her HEP this week. Patient reports MD offered steroid injection in conjunction with PT. Patient reports she feels nervous about getting an injection, and in the meantime would like to continue PT. Patient notes she is able to tolerate therapeutic exercises better post traction.   She was compliant with home exercise program.  Response to previous treatment: good, no adverse reaction   Functional change: improved tolerance for activity      OBJECTIVE   Therapeutic exercises to develop strength, endurance, ROM, flexibility and posture for 60 minutes including:  Update:   Posture: forward head/rounded shoulders  Cervical Range of Motion:  CERVICAL AROM PROM Pain/Dysfunction with Movement   Flexion 40 50     Extension 50 50     Right side bending 20 40 Denies pain or tightness   Left side bending 30 40 denies pain or tightness   Right rotation 35 40  "tightness"   Left rotation 40 45  "tightness"         MMT Right Left   Lower trapezius 4-/5 3+/5 "   Middle trapeizus 4-/5 3+/5   rhomboids 4-/5 3+/5       Upper Extremity Strength: manual muscle grades below   Right  Left   Shoulder FLEX 4/5 Shoulder FLEX 3+/5   Shoulder ABD 4/5 Shoulder ABD 3+/5   Shoulder ER 4/5 Shoulder ER 3+/5   Shoulder IR 4/5 Shoulder IR 3+/5   Elbow FLEX 5/5 Elbow Flex 4/5   Elbow EXT 5/5 Elbow EXT 4/5     Aerobic Activity: UBE x 6 minutes (3 fwd/3 bwd), Lvl 4  Chin tuck with cervical rotation: 20x - reports crepitus with rotation from Left   Chin tucks: 5 seconds, 2 x 15  OH flexion in seated with 2 lb wand + cervical retractions: 2x10  Bilateral ER with yellow theraband: 5 seconds, 20x       Supervised mechanical cervical traction, after being cleared for contraindications, 12.5 lbs for 5 minutes, 3 minute rest, 12 lbs for 5 minutes for a total of 13 minutes. Patient denied any adverse effects.    Hot pack for 10 minutes to cervical spine and thoracic spine post session.    ASSESSMENT     Update:  Updated goals below.Patient continues to progress towards short and long term physical therapy goals at this time. Patient is progressing well over all and demonstrates improved range of motion and tolerance for cervical rotation and lateral flexion since initial evaluation, however continues to demonstrate active cervical range of motion deficits and left sided cervical and shoulder pain. Patient has demonstrated reduced pain frequency and intensity throughout therapeutic exercise. As symptom management has improved, patient would benefit from upper extremity and scapulothoracic strengthening and postural correction exercises to improve postural control and endurance, and improve functional mobility. Patient would continue to benefit from skilled outpatient physical therapy to address remaining short and long term physical therapy goals.     GOALS  New Short Term Goals Status:  4 weeks  1. Patient will report attending at least one zoomba class to demonstrates return to PLOF. Progressing, not  met  2. Patient will report pain free cervical active range of motion to demonstrates improved tolerance for active cervical rotation and side bend. Progressing, not met    Previous Short Term Goals Status: Progressing  Short Term Goals (4 Weeks):   1. Patient will be independent with home exercise program to supplement therapy sessions in improving pain free mobility. MET  2. Patient will improve upper extremity manual muscle tests by 1/2 grade in all planes to improve strength for Oh mobility. Progressing, not met  3. Pt will demonstrate improved postural awareness requiring less than 3 VC during therapeutic activity/exercisein order to improve work ergonomics and posture.  MET    Long Term Goals (8 Weeks):   1. Patient will improve FOTO Survey score to </= 30% limitation to improve perceived limitation with changing and maintaining body positions.  Progressing, not met  2. Patient will improve upper extremity manual muscle tests 1 grade in all planes to improve strength for lifting and carrying tasks.  Progressing, not met  3. Patient will report no pain with cervical active range of motion in all planes to promote unlimited functional mobility. Progressing, not met  4. Patient will report no pain with OH movements to demonstrates improved symptom tolerance. Progressing, not met  5. Pt will demonstrate improved perscapular strength and endurance to show improved OH mobility and activity tolerance.  Progressing, not met  6. Pt will demonstrate improved postural awareness requiring less than 0 VC during therapeutic activity/exercisein order to improve work ergonomics and posture.  Progressing, not met    Long Term Goal Status: continue per initial plan of care.  Reasons for Recertification of Therapy: poor scapulothoracic strength and motor control, poor posture, left sided cervical radiculopathy      PLAN     Updated Certification Period: 1/4/2022 to 3/4/2022   Recommended Treatment Plan: 2 times per week for 8  weeks:  Aquatic Therapy, Cervical/Lumbar Traction, Electrical Stimulation TENS , Manual Therapy, Moist Heat/ Ice, Neuromuscular Re-ed, Orthotic Management and Training, Patient Education, Self Care, Therapeutic Activities and Therapeutic Exercise  Other Recommendations: none at this time    Cecy Bryant, PT    I CERTIFY THE NEED FOR THESE SERVICES FURNISHED UNDER THIS PLAN OF TREATMENT AND WHILE UNDER MY CARE  Physician's comments:      Physician's Signature: ___________________________________________________

## 2022-01-11 ENCOUNTER — CLINICAL SUPPORT (OUTPATIENT)
Dept: REHABILITATION | Facility: HOSPITAL | Age: 57
End: 2022-01-11
Payer: COMMERCIAL

## 2022-01-11 DIAGNOSIS — R29.898 DECREASED STRENGTH OF UPPER EXTREMITY: ICD-10-CM

## 2022-01-11 DIAGNOSIS — M25.511 BILATERAL SHOULDER PAIN, UNSPECIFIED CHRONICITY: ICD-10-CM

## 2022-01-11 DIAGNOSIS — M25.512 BILATERAL SHOULDER PAIN, UNSPECIFIED CHRONICITY: ICD-10-CM

## 2022-01-11 PROCEDURE — 97110 THERAPEUTIC EXERCISES: CPT

## 2022-01-11 PROCEDURE — 97012 MECHANICAL TRACTION THERAPY: CPT

## 2022-01-11 NOTE — PROGRESS NOTES
[facility]   Physical Therapy Treatment Note     Name: Brianna Goyal [facility]  Clinic Number: [id number]    Therapy Diagnosis:   Encounter Diagnoses   Name Primary?    Bilateral shoulder pain, unspecified chronicity     Decreased strength of upper extremity      Physician: Hola Curiel*    Visit Date: 1/11/2022    Physician Orders: PT Eval and Treat   Medical Diagnosis from Referral: M79.7 (ICD-10-CM) - Fibromyalgia   Evaluation Date: 1/4/2022  Authorization Period Expiration: 1/1/2023  Plan of Care Expiration: 3/4/2022  Progress Note Due: 10th visit  Visit # / Visits authorized: 1/ 1    PTA Visit #: 0/5     Time In: 4:07 pm  Time Out: 5:10 pm  Total Billable Time: 53 minutes    SUBJECTIVE     Pt reports: she has been sleeping better.  She was compliant with home exercise program.  Response to previous treatment: no adverse effects  Functional change: sleeping better; was able to tolerate putting away boxes where she notes she does not feel she has the strength she had before, but the pain is not present.     Pain: 0/10  Location: left shoulder      OBJECTIVE     Objective Measures updated at progress report unless specified.     Treatment     Brianna received the treatments listed below:      therapeutic exercises to develop strength, endurance and ROM for 40 minutes including:    Aerobic Activity: UBE x 6 minutes (3 fwd/3 bwd), Lvl 4  Chin tuck with cervical rotation: 20x - reports crepitus with rotation from Left- not today  Chin tucks: 5 seconds, 2 x 10  Prone on elbows resisted cervical retraction: 5 seconds, 20x   OH flexion in seated with 2 lb wand + cervical retractions: 2x10 - not today  Bilateral ER with yellow theraband: 5 seconds, 30x  Standing bilateral shoulder extension: 25x, 3-5 second hold  Scapular retractions: 5 seconds, 25x  Ball on wall: 25 CW and 25 CCW R and L     Supervised mechanical cervical traction, after being cleared for contraindications, 12.5 lbs  for 5 minutes, 3 minute rest, 12 lbs for 5 minutes for a total of 13 minutes. Patient denied any adverse effects.    Patient Education and Home Exercises     Home Exercises Provided and Patient Education Provided     Education provided:   - HEP    Written Home Exercises Provided: Patient instructed to cont prior HEP. Exercises were reviewed and Brianna was able to demonstrate them prior to the end of the session.  Brianna demonstrated good  understanding of the education provided. See EMR under Patient Instructions for exercises provided during therapy sessions    ASSESSMENT     Progressed postural corrective exercises which she tolerated well. Fatigues earlier, and will compensate with upper trapezius. Will continue to progress as tolerated.     Brianna Is progressing well towards her goals.   Pt prognosis is Good.     Pt will continue to benefit from skilled outpatient physical therapy to address the deficits listed in the problem list box on initial evaluation, provide pt/family education and to maximize pt's level of independence in the home and community environment.     Pt's spiritual, cultural and educational needs considered and pt agreeable to plan of care and goals.     Anticipated barriers to physical therapy: none    Goals:  New Short Term Goals Status:  4 weeks  1. Patient will report attending at least one zoomba class to demonstrates return to PLOF. Progressing, not met  2. Patient will report pain free cervical active range of motion to demonstrates improved tolerance for active cervical rotation and side bend. Progressing, not met     Previous Short Term Goals Status: Progressing  Short Term Goals (4 Weeks):   1. Patient will be independent with home exercise program to supplement therapy sessions in improving pain free mobility. MET  2. Patient will improve upper extremity manual muscle tests by 1/2 grade in all planes to improve strength for Oh mobility. Progressing, not met  3. Pt will demonstrate  improved postural awareness requiring less than 3 VC during therapeutic activity/exercisein order to improve work ergonomics and posture.  MET     Long Term Goals (8 Weeks):   1. Patient will improve FOTO Survey score to </= 30% limitation to improve perceived limitation with changing and maintaining body positions.  Progressing, not met  2. Patient will improve upper extremity manual muscle tests 1 grade in all planes to improve strength for lifting and carrying tasks.  Progressing, not met  3. Patient will report no pain with cervical active range of motion in all planes to promote unlimited functional mobility. Progressing, not met  4. Patient will report no pain with OH movements to demonstrates improved symptom tolerance. Progressing, not met  5. Pt will demonstrate improved perscapular strength and endurance to show improved OH mobility and activity tolerance.  Progressing, not met  6. Pt will demonstrate improved postural awareness requiring less than 0 VC during therapeutic activity/exercisein order to improve work ergonomics and posture.  Progressing, not met      PLAN     Progress scapulothoracic and postural exercises as tolerated.    Cecy Bryant, PT

## 2022-01-14 ENCOUNTER — CLINICAL SUPPORT (OUTPATIENT)
Dept: REHABILITATION | Facility: HOSPITAL | Age: 57
End: 2022-01-14
Payer: COMMERCIAL

## 2022-01-14 DIAGNOSIS — M25.511 BILATERAL SHOULDER PAIN, UNSPECIFIED CHRONICITY: ICD-10-CM

## 2022-01-14 DIAGNOSIS — M25.512 BILATERAL SHOULDER PAIN, UNSPECIFIED CHRONICITY: ICD-10-CM

## 2022-01-14 DIAGNOSIS — R29.898 DECREASED STRENGTH OF UPPER EXTREMITY: ICD-10-CM

## 2022-01-14 PROCEDURE — 97012 MECHANICAL TRACTION THERAPY: CPT | Mod: CQ

## 2022-01-14 PROCEDURE — 97110 THERAPEUTIC EXERCISES: CPT | Mod: CQ

## 2022-01-14 NOTE — PROGRESS NOTES
"[facility]   Physical Therapy Treatment Note     Name: Brianna Goyal [facility]  Clinic Number: [id number]    Therapy Diagnosis:   Encounter Diagnoses   Name Primary?    Bilateral shoulder pain, unspecified chronicity     Decreased strength of upper extremity      Physician: Hola Curiel*    Visit Date: 1/14/2022    Physician Orders: PT Eval and Treat   Medical Diagnosis from Referral: M79.7 (ICD-10-CM) - Fibromyalgia   Evaluation Date: 1/4/2022  Authorization Period Expiration: 12/31/2022  Plan of Care Expiration: 3/4/2022  Progress Note Due: 10th visit  Visit # / Visits authorized: 2 / 20 (new referral); total visits 15    PTA Visit #: 1 / 5     Time In: 0900  Time Out: 0954  Total Treatment Time: 54 minutes  Total Billable Time: 54 minutes    SUBJECTIVE     Patient reports: she attended her first jazzercise class. She reports no exacerbation of symptoms; states that she felt fatigued after. States that after last session she had a headache on the Right side of her head that went from the base of her skull to over her eye. Patient also reports washing the dishes and feeling like she went "cross eyed". She denies any dizziness with vision change.  She was compliant with home exercise program.  Response to previous treatment: no adverse effects  Functional change: feels like she is lacking strength; goal is to make her bed at home without getting fatigued; reports increased cervical spine stiffness with ADL's    Pain: 1/10  Location: Left shoulder, cervical spine    OBJECTIVE     Objective Measures updated at progress report unless specified.     Treatment     Brianna received the treatments listed below:      therapeutic exercises to develop strength, endurance and ROM for 42 minutes including:    Aerobic Activity: UBE x 6 minutes (3 fwd/3 bwd), Lvl 4  Chin tucks: 5 seconds, 2 x 15  Prone on elbows resisted cervical retraction: 5 seconds, 2 x 10  +Prone scapular retractions: add " next visit  +Prone Ys/Ts/Is: add next visit     Bilateral ER with Yellow TheraBand: 5 seconds, 3 x 10  Standing bilateral shoulder extension with Green TheraBand: 5 second hold, 3 x 10  Scapular retractions with Light Pink TheraBand: 5 second hold, 3 x 10  Ball on wall: 25 CW and 25 CCW R and L  Modalities: Supervised Mechanical Cervical Traction, after being cleared for contraindications: 13lbs for 5 minutes, 1 minute rest, 14 lbs for 5 minutes for a total of 12 minutes. Patient denied any adverse effects.    Patient Education and Home Exercises     Home Exercises Provided and Patient Education Provided     Education provided:   - Benefits of cervical traction to reduce pain and improve mobility  - Continued compliance with her HEP    Written Home Exercises Provided: Patient instructed to cont prior HEP. Exercises were reviewed and Brianna was able to demonstrate them prior to the end of the session.  Brianna demonstrated good  understanding of the education provided. See EMR under Patient Instructions for exercises provided during therapy sessions    ASSESSMENT     Patient fatigued easily with shoulder extension with TheraBand reporting bilateral shoulder and thoracic spine fatigue. Good tolerance to cervical traction reporting no exacerbation of symptoms. Shortened treatment session performed secondary to patient request to leave early because of a work meeting. Consider addition of scapular strengthening exercises next visit.  Brianna Is progressing well towards her goals.   Pt prognosis is Good.     Pt will continue to benefit from skilled outpatient physical therapy to address the deficits listed in the problem list box on initial evaluation, provide pt/family education and to maximize pt's level of independence in the home and community environment.     Pt's spiritual, cultural and educational needs considered and pt agreeable to plan of care and goals.     Anticipated barriers to physical therapy:  none    Goals:  New Short Term Goals Status:  4 weeks  1. Patient will report attending at least one zoomba class to demonstrates return to PLOF. Progressing, not met  2. Patient will report pain free cervical active range of motion to demonstrates improved tolerance for active cervical rotation and side bend. Progressing, not met     Previous Short Term Goals Status: Progressing  Short Term Goals (4 Weeks):   1. Patient will be independent with home exercise program to supplement therapy sessions in improving pain free mobility. MET  2. Patient will improve upper extremity manual muscle tests by 1/2 grade in all planes to improve strength for Oh mobility. Progressing, not met  3. Pt will demonstrate improved postural awareness requiring less than 3 VC during therapeutic activity/exercisein order to improve work ergonomics and posture.  MET     Long Term Goals (8 Weeks):   1. Patient will improve FOTO Survey score to </= 30% limitation to improve perceived limitation with changing and maintaining body positions.  Progressing, not met  2. Patient will improve upper extremity manual muscle tests 1 grade in all planes to improve strength for lifting and carrying tasks.  Progressing, not met  3. Patient will report no pain with cervical active range of motion in all planes to promote unlimited functional mobility. Progressing, not met  4. Patient will report no pain with OH movements to demonstrates improved symptom tolerance. Progressing, not met  5. Pt will demonstrate improved perscapular strength and endurance to show improved OH mobility and activity tolerance.  Progressing, not met  6. Pt will demonstrate improved postural awareness requiring less than 0 VC during therapeutic activity/exercisein order to improve work ergonomics and posture.  Progressing, not met      PLAN     Progress scapulothoracic and postural exercises as tolerated.    Janneth Strong, PTA

## 2022-01-21 ENCOUNTER — CLINICAL SUPPORT (OUTPATIENT)
Dept: REHABILITATION | Facility: HOSPITAL | Age: 57
End: 2022-01-21
Payer: COMMERCIAL

## 2022-01-21 DIAGNOSIS — R29.898 DECREASED STRENGTH OF UPPER EXTREMITY: ICD-10-CM

## 2022-01-21 DIAGNOSIS — M25.511 BILATERAL SHOULDER PAIN, UNSPECIFIED CHRONICITY: ICD-10-CM

## 2022-01-21 DIAGNOSIS — M25.512 BILATERAL SHOULDER PAIN, UNSPECIFIED CHRONICITY: ICD-10-CM

## 2022-01-21 PROCEDURE — 97110 THERAPEUTIC EXERCISES: CPT

## 2022-01-21 PROCEDURE — 97012 MECHANICAL TRACTION THERAPY: CPT

## 2022-01-21 NOTE — PROGRESS NOTES
[facility]   Physical Therapy Treatment Note     Name: Brianna Goyal [facility]  Clinic Number: [id number]    Therapy Diagnosis:   Encounter Diagnoses   Name Primary?    Bilateral shoulder pain, unspecified chronicity     Decreased strength of upper extremity      Physician: Hola Curiel*    Visit Date: 1/21/2022    Physician Orders: PT Eval and Treat   Medical Diagnosis from Referral: M79.7 (ICD-10-CM) - Fibromyalgia   Evaluation Date: 1/4/2022  Authorization Period Expiration: 12/31/2022  Plan of Care Expiration: 3/4/2022  Progress Note Due: 10th visit  Visit # / Visits authorized: 4 / 20 (new referral); total visits 15    PTA Visit #: 0 / 5     Time In: 10:00 am  Time Out: 11:10 am  Total Treatment Time: 70 minutes  Total Billable Time: 60 minutes    SUBJECTIVE     Patient reports: she is looking to attend zoomba class 2 days a week instead of 1x per week.  She was compliant with home exercise program.  Response to previous treatment: no adverse effects  Functional change: zoomba has gone well    Pain: 1/10  Location: Left shoulder, cervical spine    OBJECTIVE     Objective Measures updated at progress report unless specified.     Treatment     Brianna received the treatments listed below:      therapeutic exercises to develop strength, endurance and ROM for 48 minutes including:    Aerobic Activity: UBE x 6 minutes (3 fwd/3 bwd), Lvl 4  Chin tucks: 5 seconds, 2 x 15  Prone scapular retractions: green theraband, 5 seconds, 20x  Prone Ys/Ts/Is: 1 lb 2x10 each  Prone W's: 5 seconds, 2x10 each  Standing bilateral shoulder extension with Green TheraBand: 5 second hold, 3 x 10    Modalities: Supervised Mechanical Cervical Traction, after being cleared for contraindications: 13lbs for 5 minutes, 1 minute rest, 14 lbs for 5 minutes for a total of 12 minutes. Patient denied any adverse effects.    Not today:  Bilateral ER with Yellow TheraBand: 5 seconds, 3 x 10  Scapular  retractions with Light Pink TheraBand: 5 second hold, 3 x 10  Ball on wall: 25 CW and 25 CCW R and L  Patient Education and Home Exercises     Home Exercises Provided and Patient Education Provided     Education provided:   - Benefits of cervical traction to reduce pain and improve mobility  - Continued compliance with her HEP    Written Home Exercises Provided: Patient instructed to cont prior HEP. Exercises were reviewed and Brianna was able to demonstrate them prior to the end of the session.  Brianna demonstrated good  understanding of the education provided. See EMR under Patient Instructions for exercises provided during therapy sessions    ASSESSMENT     Significant fatigue following therapeutic exercise emphasizing scapulothoracic musculature; however, improved tolerance for activity since beginning physical therapy. No exacerbation of pain during today's session.      Brianna Is progressing well towards her goals.   Pt prognosis is Good.     Pt will continue to benefit from skilled outpatient physical therapy to address the deficits listed in the problem list box on initial evaluation, provide pt/family education and to maximize pt's level of independence in the home and community environment.     Pt's spiritual, cultural and educational needs considered and pt agreeable to plan of care and goals.     Anticipated barriers to physical therapy: none    Goals:  New Short Term Goals Status:  4 weeks  1. Patient will report attending at least one zoomba class to demonstrates return to PLOF. Progressing, not met  2. Patient will report pain free cervical active range of motion to demonstrates improved tolerance for active cervical rotation and side bend. Progressing, not met     Previous Short Term Goals Status: Progressing  Short Term Goals (4 Weeks):   1. Patient will be independent with home exercise program to supplement therapy sessions in improving pain free mobility. MET  2. Patient will improve upper extremity  manual muscle tests by 1/2 grade in all planes to improve strength for Oh mobility. Progressing, not met  3. Pt will demonstrate improved postural awareness requiring less than 3 VC during therapeutic activity/exercisein order to improve work ergonomics and posture.  MET     Long Term Goals (8 Weeks):   1. Patient will improve FOTO Survey score to </= 30% limitation to improve perceived limitation with changing and maintaining body positions.  Progressing, not met  2. Patient will improve upper extremity manual muscle tests 1 grade in all planes to improve strength for lifting and carrying tasks.  Progressing, not met  3. Patient will report no pain with cervical active range of motion in all planes to promote unlimited functional mobility. Progressing, not met  4. Patient will report no pain with OH movements to demonstrates improved symptom tolerance. Progressing, not met  5. Pt will demonstrate improved perscapular strength and endurance to show improved OH mobility and activity tolerance.  Progressing, not met  6. Pt will demonstrate improved postural awareness requiring less than 0 VC during therapeutic activity/exercisein order to improve work ergonomics and posture.  Progressing, not met      PLAN     Progress scapulothoracic and postural exercises as tolerated.    Cecy Bryant, PT

## 2022-01-25 ENCOUNTER — CLINICAL SUPPORT (OUTPATIENT)
Dept: REHABILITATION | Facility: HOSPITAL | Age: 57
End: 2022-01-25
Payer: COMMERCIAL

## 2022-01-25 DIAGNOSIS — M25.511 BILATERAL SHOULDER PAIN, UNSPECIFIED CHRONICITY: ICD-10-CM

## 2022-01-25 DIAGNOSIS — M25.512 BILATERAL SHOULDER PAIN, UNSPECIFIED CHRONICITY: ICD-10-CM

## 2022-01-25 DIAGNOSIS — R29.898 DECREASED STRENGTH OF UPPER EXTREMITY: ICD-10-CM

## 2022-01-25 PROCEDURE — 97112 NEUROMUSCULAR REEDUCATION: CPT | Mod: CQ

## 2022-01-25 PROCEDURE — 97110 THERAPEUTIC EXERCISES: CPT | Mod: CQ

## 2022-01-25 NOTE — PROGRESS NOTES
[facility]   Physical Therapy Treatment Note     Name: Brianna Goyal [facility] Number: [id number]    Therapy Diagnosis:   Encounter Diagnoses   Name Primary?    Bilateral shoulder pain, unspecified chronicity     Decreased strength of upper extremity      Physician: Hola Curiel*    Visit Date: 1/25/2022    Physician Orders: PT Eval and Treat   Medical Diagnosis from Referral: M79.7 (ICD-10-CM) - Fibromyalgia   Evaluation Date: 1/4/2022  Authorization Period Expiration: 12/31/2022  Plan of Care Expiration: 3/4/2022  Progress Note Due: 10th visit  Visit # / Visits authorized: 5 / 20 (new referral); total visits 15    PTA Visit #: 1 / 5     Time In: 1510  Time Out: 1610  Total Treatment Time: 49 minutes + 10 minutes of heat (unsupervised)  Total Billable Time: 49 minutes    SUBJECTIVE     Patient reports: that she feels like she is progressing, however still feels her strength is decreased. She states that she plans to take two [employer] classes this week. States she is trying to take it easy so she does not injure herself again.  She was compliant with home exercise program.  Response to previous treatment: good, no adverse reaction; appropriate muscle response  Functional change: able to attend varun and jazzercise classes    Pain: 0/10, currently  Location: Left shoulder, cervical spine    OBJECTIVE     Objective Measures updated at progress report unless specified.     Treatment     Brianna received the treatments listed below:      therapeutic exercises to develop strength, endurance and ROM for 38 minutes including:    Aerobic Activity: UBE x 6 minutes (3 fwd/3 bwd), Lvl 5  Chin tucks: 5 seconds, 2 x 15  Prone scapular retractions: green theraband, 5 seconds, 20x  Prone Ys/Ts/Is: 1lb, 2 x 10 each UE - emphasis on scapular retraction prior to lift   Prone W's: 5 seconds, 2 x 12  Standing bilateral shoulder extension with Green TheraBand: 5 second hold, 3 x  10    Modalities: Supervised Mechanical Cervical Traction, after being cleared for contraindications: 14lbs for 5 minutes, 1 minute rest, 14 lbs for 5 minutes for a total of 12 minutes. Patient denied any adverse effects.    Patient Education and Home Exercises     Home Exercises Provided and Patient Education Provided     Education provided:   - Benefits of cervical traction to reduce pain and improve mobility  - Continued compliance with her HEP    Written Home Exercises Provided: Patient instructed to cont prior HEP. Exercises were reviewed and rBianna was able to demonstrate them prior to the end of the session.  Brianna demonstrated good  understanding of the education provided. See EMR under Patient Instructions for exercises provided during therapy sessions    ASSESSMENT     Increased difficulty and fatigue with scapular strengthening exercises. Patient is progressing well with current POC.   Brianna Is progressing well towards her goals.   Pt prognosis is Good.     Pt will continue to benefit from skilled outpatient physical therapy to address the deficits listed in the problem list box on initial evaluation, provide pt/family education and to maximize pt's level of independence in the home and community environment.     Pt's spiritual, cultural and educational needs considered and pt agreeable to plan of care and goals.     Anticipated barriers to physical therapy: none    Goals:  New Short Term Goals Status:  4 weeks  1. Patient will report attending at least one zoomba class to demonstrates return to PLOF. Progressing, not met  2. Patient will report pain free cervical active range of motion to demonstrates improved tolerance for active cervical rotation and side bend. Progressing, not met     Previous Short Term Goals Status: Progressing  Short Term Goals (4 Weeks):   1. Patient will be independent with home exercise program to supplement therapy sessions in improving pain free mobility. MET  2. Patient will  improve upper extremity manual muscle tests by 1/2 grade in all planes to improve strength for Oh mobility. Progressing, not met  3. Pt will demonstrate improved postural awareness requiring less than 3 VC during therapeutic activity/exercisein order to improve work ergonomics and posture.  MET     Long Term Goals (8 Weeks):   1. Patient will improve FOTO Survey score to </= 30% limitation to improve perceived limitation with changing and maintaining body positions.  Progressing, not met  2. Patient will improve upper extremity manual muscle tests 1 grade in all planes to improve strength for lifting and carrying tasks.  Progressing, not met  3. Patient will report no pain with cervical active range of motion in all planes to promote unlimited functional mobility. Progressing, not met  4. Patient will report no pain with OH movements to demonstrates improved symptom tolerance. Progressing, not met  5. Pt will demonstrate improved perscapular strength and endurance to show improved OH mobility and activity tolerance.  Progressing, not met  6. Pt will demonstrate improved postural awareness requiring less than 0 VC during therapeutic activity/exercisein order to improve work ergonomics and posture.  Progressing, not met      PLAN     Progress scapulothoracic and postural exercises as tolerated.    Janneth Strong, PTA

## 2022-01-27 NOTE — PROGRESS NOTES
"[facility]   Physical Therapy Treatment Note     Name: Brianna Goyal [facility]  Clinic Number: [id number]    Therapy Diagnosis:   Encounter Diagnoses   Name Primary?    Bilateral shoulder pain, unspecified chronicity     Decreased strength of upper extremity      Physician: Hola Curiel*    Visit Date: 1/28/2022    Physician Orders: PT Eval and Treat   Medical Diagnosis from Referral: M79.7 (ICD-10-CM) - Fibromyalgia   Evaluation Date: 1/4/2022  Authorization Period Expiration: 12/31/2022  Plan of Care Expiration: 3/4/2022  Progress Note Due: 10th visit  Visit # / Visits authorized: 6 / 20 (new referral); total visits 18    PTA Visit #: 2 / 5     Time In: 0902  Time Out: 1021  Total Treatment Time: 69 minutes + 10 minutes of heat (unsupervised)  Total Billable Time: 57 minutes (3 TE, 1 MT, 1 Mech Tx)    SUBJECTIVE     Patient reports: "I feel more stiff today." She reports Left sided neck and low back tightness. Improves with movement. Stiffness and discomfort increases with cervical rotation to the Right.   She was compliant with home exercise program.  Response to previous treatment: good, no adverse reaction; appropriate muscle response  Functional change: able to attend [employer] and [email or web address] classes    Pain: 0/10, currently  Location: Left shoulder, cervical spine    OBJECTIVE     Objective Measures updated at progress report unless specified.     Treatment     Brianna received the treatments listed below:      Manual therapy techniques to improve mobility and increase tissue extensibility for 10 minutes:  - Suboccipital Release (patient reported relief of symptoms)  - Manual Left upper trap stretch: 30 second hold, 3x  - STM to Left upper trap/Trigger point release    Therapeutic exercises to develop strength, endurance and ROM for 47 minutes including:    Aerobic Activity: UBE x 6 minutes (3 fwd/3 bwd), Lvl 5  Chin tucks: 5 seconds, 2 x 15  Prone Ys/Ts/Is: 1lb, 2 x 10 " each UE - emphasis on scapular retraction prior to lift   Prone W's: 5 seconds, 2 x 12  +Open books: 2 x 10, 5 second hold    +No Money's with back to wall + chin tuck: Red TheraBand, 2 x 10 with a 5 second hold  Standing bilateral shoulder extension with Green TheraBand: 5 second hold, 3 x 10  +Standing Left QL stretch: 10 second hold, 10x - added to HEP    Modalities: Supervised Mechanical Cervical Traction, after being cleared for contraindications: 18lbs for 5 minutes, 2 minute rest, 17 lbs for 5 minutes for a total of 12 minutes. Patient denied any adverse effects.    Moist heat pack applied to cervical spine in supine post session x 10 minutes for pain reduction.    Patient Education and Home Exercises     Home Exercises Provided and Patient Education Provided     Education provided: 1/28/2022  - Updated HEP to include:  Standing L QL stretch: 10 second hold, 10x  Seated lumbar flexion stretch: 5 second hold, 2 x 10  SL open books: 5 second hold, 2 x 10  - Continued compliance with her HEP to reduce pain and increase tissue extensibility    Written Home Exercises Provided: Patient instructed to cont prior HEP in addition to new exercises provided today (1/28/2022). Exercises were reviewed and Brianna was able to demonstrate them prior to the end of the session.  Brianna demonstrated good  understanding of the education provided. See EMR under Patient Instructions for exercises provided during therapy sessions    ASSESSMENT     Improvement in subjective complaints reported post session. Patient is challenged with scapular strengthening exercises requiring verbal and tactile cueing for proper muscle activation. Visible upper trap compensation with prone exercises; improves with cervical rotation to opposite side for inhibition of upper trap muscles.   Brianna Is progressing well towards her goals.   Pt prognosis is Good.     Pt will continue to benefit from skilled outpatient physical therapy to address the deficits  listed in the problem list box on initial evaluation, provide pt/family education and to maximize pt's level of independence in the home and community environment.     Pt's spiritual, cultural and educational needs considered and pt agreeable to plan of care and goals.     Anticipated barriers to physical therapy: none    Goals:  New Short Term Goals Status:  4 weeks  1. Patient will report attending at least one zoomba class to demonstrates return to PLOF. Progressing, not met  2. Patient will report pain free cervical active range of motion to demonstrates improved tolerance for active cervical rotation and side bend. Progressing, not met     Previous Short Term Goals Status: Progressing  Short Term Goals (4 Weeks):   1. Patient will be independent with home exercise program to supplement therapy sessions in improving pain free mobility. MET  2. Patient will improve upper extremity manual muscle tests by 1/2 grade in all planes to improve strength for Oh mobility. Progressing, not met  3. Pt will demonstrate improved postural awareness requiring less than 3 VC during therapeutic activity/exercisein order to improve work ergonomics and posture.  MET     Long Term Goals (8 Weeks):   1. Patient will improve FOTO Survey score to </= 30% limitation to improve perceived limitation with changing and maintaining body positions.  Progressing, not met  2. Patient will improve upper extremity manual muscle tests 1 grade in all planes to improve strength for lifting and carrying tasks.  Progressing, not met  3. Patient will report no pain with cervical active range of motion in all planes to promote unlimited functional mobility. Progressing, not met  4. Patient will report no pain with OH movements to demonstrates improved symptom tolerance. Progressing, not met  5. Pt will demonstrate improved perscapular strength and endurance to show improved OH mobility and activity tolerance.  Progressing, not met  6. Pt will  demonstrate improved postural awareness requiring less than 0 VC during therapeutic activity/exercisein order to improve work ergonomics and posture.  Progressing, not met    PLAN     Progress scapulothoracic and postural exercises as tolerated.    Janneth Strong, PTA

## 2022-01-28 ENCOUNTER — CLINICAL SUPPORT (OUTPATIENT)
Dept: REHABILITATION | Facility: HOSPITAL | Age: 57
End: 2022-01-28
Payer: COMMERCIAL

## 2022-01-28 DIAGNOSIS — M25.511 BILATERAL SHOULDER PAIN, UNSPECIFIED CHRONICITY: ICD-10-CM

## 2022-01-28 DIAGNOSIS — M25.512 BILATERAL SHOULDER PAIN, UNSPECIFIED CHRONICITY: ICD-10-CM

## 2022-01-28 DIAGNOSIS — R29.898 DECREASED STRENGTH OF UPPER EXTREMITY: ICD-10-CM

## 2022-01-28 PROCEDURE — 97012 MECHANICAL TRACTION THERAPY: CPT | Mod: CQ

## 2022-01-28 PROCEDURE — 97140 MANUAL THERAPY 1/> REGIONS: CPT | Mod: CQ,59

## 2022-01-28 PROCEDURE — 97110 THERAPEUTIC EXERCISES: CPT | Mod: CQ

## 2022-01-31 ENCOUNTER — CLINICAL SUPPORT (OUTPATIENT)
Dept: REHABILITATION | Facility: HOSPITAL | Age: 57
End: 2022-01-31
Payer: COMMERCIAL

## 2022-01-31 DIAGNOSIS — M25.512 BILATERAL SHOULDER PAIN, UNSPECIFIED CHRONICITY: ICD-10-CM

## 2022-01-31 DIAGNOSIS — R29.898 DECREASED STRENGTH OF UPPER EXTREMITY: ICD-10-CM

## 2022-01-31 DIAGNOSIS — M25.511 BILATERAL SHOULDER PAIN, UNSPECIFIED CHRONICITY: ICD-10-CM

## 2022-01-31 PROCEDURE — 97012 MECHANICAL TRACTION THERAPY: CPT | Mod: CQ

## 2022-01-31 PROCEDURE — 97110 THERAPEUTIC EXERCISES: CPT | Mod: CQ

## 2022-01-31 NOTE — PROGRESS NOTES
"[facility]   Physical Therapy Treatment Note     Name: Brianna Goyal [facility]  Clinic Number: [id number]    Therapy Diagnosis:   Encounter Diagnoses   Name Primary?    Bilateral shoulder pain, unspecified chronicity     Decreased strength of upper extremity      Physician: Hola Curiel*    Visit Date: 1/31/2022    Physician Orders: PT Eval and Treat   Medical Diagnosis from Referral: M79.7 (ICD-10-CM) - Fibromyalgia   Evaluation Date: 1/4/2022  Authorization Period Expiration: 12/31/2022  Plan of Care Expiration: 3/4/2022  Progress Note Due: 10th visit  Visit # / Visits authorized: 7 / 20 (new referral); total visits 19    PTA Visit #: 3 / 5     Time In: 1605   Time Out: 1705  Total Treatment Time: 50 minutes + 10 minutes of heat (unsupervised)  Total Billable Time: 50 minutes (3 TE, 1 Mech Tx)    SUBJECTIVE     Patient reports: "I feel better today."  She was compliant with home exercise program.  Response to previous treatment: good, no adverse reaction; appropriate muscle response  Functional change: able to attend [employer] and [employer] classes    Pain: 0/10, currently  Location: Left shoulder, cervical spine    OBJECTIVE     Objective Measures updated at progress report unless specified.     Treatment     Brianna received the treatments listed below:      Manual therapy techniques to improve mobility and increase tissue extensibility for 00 minutes:  - Suboccipital Release (patient reported relief of symptoms)  - Manual Left upper trap stretch: 30 second hold, 3x  - STM to Left upper trap/Trigger point release    Therapeutic exercises to develop strength, endurance and ROM for 38 minutes including:    Aerobic Activity: UBE x 6 minutes (3 fwd/3 bwd), Lvl 5  Chin tucks: 5 seconds, 2 x 15  Prone Ys/Ts/Is: 1lb, 2 x 10 each UE - emphasis on scapular retraction prior to lift   Prone W's: 5 seconds, 2 x 15  Open books: 2 x 10, 5 second hold    No Money's with back to wall + chin " tuck: Red TheraBand, 2 x 10 with a 5 second hold  Standing bilateral shoulder extension with Green TheraBand: 5 second hold, 3 x 10    Modalities: Supervised Mechanical Cervical Traction, after being cleared for contraindications: 15lbs for 5 minutes, 2 minute rest, 17 lbs for 5 minutes for a total of 12 minutes. Patient denied any adverse effects.    Moist heat pack applied to cervical spine in supine post session x 10 minutes for pain reduction.    Patient Education and Home Exercises     Home Exercises Provided and Patient Education Provided     Education provided: 1/28/2022  - Updated HEP to include:  Standing L QL stretch: 10 second hold, 10x  Seated lumbar flexion stretch: 5 second hold, 2 x 10  SL open books: 5 second hold, 2 x 10  - Continued compliance with her HEP to reduce pain and increase tissue extensibility    Written Home Exercises Provided: Patient instructed to cont prior HEP in addition to new exercises provided today (1/28/2022). Exercises were reviewed and Brianna was able to demonstrate them prior to the end of the session.  Brianna demonstrated good  understanding of the education provided. See EMR under Patient Instructions for exercises provided during therapy sessions    ASSESSMENT     Good tolerance to postural exercises today requiring minimal verbal cueing for proper technique and muscle activation. She demonstrates improvement in posture and cervical spine mobility. Continue progressing patient towards treatment goals per POC.   Brianna Is progressing well towards her goals.   Pt prognosis is Good.     Pt will continue to benefit from skilled outpatient physical therapy to address the deficits listed in the problem list box on initial evaluation, provide pt/family education and to maximize pt's level of independence in the home and community environment.     Pt's spiritual, cultural and educational needs considered and pt agreeable to plan of care and goals.     Anticipated barriers to  physical therapy: none    Goals:  New Short Term Goals Status:  4 weeks  1. Patient will report attending at least one [employer] class to demonstrates return to PLOF. Progressing, not met  2. Patient will report pain free cervical active range of motion to demonstrates improved tolerance for active cervical rotation and side bend. Progressing, not met     Previous Short Term Goals Status: Progressing  Short Term Goals (4 Weeks):   1. Patient will be independent with home exercise program to supplement therapy sessions in improving pain free mobility. MET  2. Patient will improve upper extremity manual muscle tests by 1/2 grade in all planes to improve strength for Oh mobility. Progressing, not met  3. Pt will demonstrate improved postural awareness requiring less than 3 VC during therapeutic activity/exercisein order to improve work ergonomics and posture.  MET     Long Term Goals (8 Weeks):   1. Patient will improve FOTO Survey score to </= 30% limitation to improve perceived limitation with changing and maintaining body positions.  Progressing, not met  2. Patient will improve upper extremity manual muscle tests 1 grade in all planes to improve strength for lifting and carrying tasks.  Progressing, not met  3. Patient will report no pain with cervical active range of motion in all planes to promote unlimited functional mobility. Progressing, not met  4. Patient will report no pain with OH movements to demonstrates improved symptom tolerance. Progressing, not met  5. Pt will demonstrate improved perscapular strength and endurance to show improved OH mobility and activity tolerance.  Progressing, not met  6. Pt will demonstrate improved postural awareness requiring less than 0 VC during therapeutic activity/exercisein order to improve work ergonomics and posture.  Progressing, not met    PLAN     Progress scapulothoracic and postural exercises as tolerated.    Janneth Strong, PTA

## 2022-02-03 NOTE — PROGRESS NOTES
[facility]   Physical Therapy Treatment Note     Name: Brianna Goyal [facility] Number: [id number]    Therapy Diagnosis:   Encounter Diagnoses   Name Primary?    Bilateral shoulder pain, unspecified chronicity     Decreased strength of upper extremity      Physician: Hola Curiel*    Visit Date: 2/4/2022    Physician Orders: PT Eval and Treat   Medical Diagnosis from Referral: M79.7 (ICD-10-CM) - Fibromyalgia   Evaluation Date: 1/4/2022  Authorization Period Expiration: 12/31/2022  Plan of Care Expiration: 3/4/2022  Progress Note Due: 10th visit  Visit # / Visits authorized: 8 / 20 (new referral); total visits 20    PTA Visit #: 4 / 5     Time In: 1010  Time Out: 1110  Total Treatment Time: 50 minutes + 10 minutes of heat (unsupervised)  Total Billable Time: 50 minutes (1 MT, 2 TE, 1 Mech Tx)    SUBJECTIVE     Patient reports: numbness that has occurred over the last three days in her Left lower arm. She states that it happened on the way here when she was driving at 10 and 2. Reports that it goes away within a few minutes.  She was compliant with home exercise program.  Response to previous treatment: good, no adverse reaction; appropriate muscle response  Functional change: able to attend [employer] and [employer] classes    Pain: 0/10, currently  Location: Left shoulder, cervical spine    OBJECTIVE     Objective Measures updated at progress report unless specified.     Treatment     Brianna received the treatments listed below:      Manual therapy techniques to improve mobility and increase tissue extensibility for 15 minutes:  - Suboccipital Release (patient reported relief of symptoms)  - Cervical distraction  - Manual Left upper trap stretch: 30 second hold, 3x  - STM to Left upper trap/Trigger point release    Therapeutic exercises to develop strength, endurance and ROM for 23 minutes including:    Aerobic Activity: UBE x 6 minutes (3 fwd/3 bwd), Lvl 5  Chin tucks: 5  seconds, 2 x 15  Open books: 2 x 10, 5 second hold each side  +Thread the needle in quadruped: 10x - 5lb dumbbell in Right hand to avoid excessive wrist extension  +Cat/Camel: 5 second hold, 15x - 5lb dumbbell in Right hand to avoid excessive wrist extension    Deferred today secondary to subjective complaints: consider introducing in future sessions pending patient presentation  Prone Ys/Ts/Is: 1lb, 2 x 10 each UE - emphasis on scapular retraction prior to lift  Prone W's: 5 seconds, 2 x 15   No Money's with back to wall + chin tuck: Red TheraBand, 2 x 10 with a 5 second hold  Standing bilateral shoulder extension with Green TheraBand: 5 second hold, 3 x 10    Modalities: Supervised Mechanical Cervical Traction, after being cleared for contraindications: 17lbs for 5 minutes, 2 minute rest, 15 lbs for 5 minutes for a total of 12 minutes. Patient denied any adverse effects. Traction machine placed on middle level (20 degrees) to target C3-C7.    Moist heat pack applied to cervical spine in supine post session x 10 minutes for pain reduction.    Patient Education and Home Exercises     Home Exercises Provided and Patient Education Provided     Education provided: 1/28/2022  - Updated HEP to include:  Standing L QL stretch: 10 second hold, 10x  Seated lumbar flexion stretch: 5 second hold, 2 x 10  SL open books: 5 second hold, 2 x 10  - Continued compliance with her HEP to reduce pain and increase tissue extensibility    Written Home Exercises Provided: Patient instructed to cont prior HEP in addition to new exercises provided today (1/28/2022). Exercises were reviewed and Brianna was able to demonstrate them prior to the end of the session.  Brianna demonstrated good  understanding of the education provided. See EMR under Patient Instructions for exercises provided during therapy sessions    ASSESSMENT     Session focused on thoracic and cervical spine mobility. Deferred scapular strengthening exercises secondary to  patient presentation and symptoms. Patient noted improvement in numbness/tingling with cervical distraction. Soft tissue restrictions present in L upper trap; good response to trigger point release. Performed manual cervical traction at 20 degrees of flexion today. She noted increased discomfort in Right C1-C2 with release of traction; stated that it did not last and returned to baseline post.   Brianna Is progressing well towards her goals.   Pt prognosis is Good.     Pt will continue to benefit from skilled outpatient physical therapy to address the deficits listed in the problem list box on initial evaluation, provide pt/family education and to maximize pt's level of independence in the home and community environment.     Pt's spiritual, cultural and educational needs considered and pt agreeable to plan of care and goals.     Anticipated barriers to physical therapy: none    Goals:  New Short Term Goals Status:  4 weeks  1. Patient will report attending at least one [employer] class to demonstrates return to PLOF. Progressing, not met  2. Patient will report pain free cervical active range of motion to demonstrates improved tolerance for active cervical rotation and side bend. Progressing, not met     Previous Short Term Goals Status: Progressing  Short Term Goals (4 Weeks):   1. Patient will be independent with home exercise program to supplement therapy sessions in improving pain free mobility. MET  2. Patient will improve upper extremity manual muscle tests by 1/2 grade in all planes to improve strength for Oh mobility. Progressing, not met  3. Pt will demonstrate improved postural awareness requiring less than 3 VC during therapeutic activity/exercisein order to improve work ergonomics and posture.  MET     Long Term Goals (8 Weeks):   1. Patient will improve FOTO Survey score to </= 30% limitation to improve perceived limitation with changing and maintaining body positions.  Progressing, not met  2. Patient will  improve upper extremity manual muscle tests 1 grade in all planes to improve strength for lifting and carrying tasks.  Progressing, not met  3. Patient will report no pain with cervical active range of motion in all planes to promote unlimited functional mobility. Progressing, not met  4. Patient will report no pain with OH movements to demonstrates improved symptom tolerance. Progressing, not met  5. Pt will demonstrate improved perscapular strength and endurance to show improved OH mobility and activity tolerance.  Progressing, not met  6. Pt will demonstrate improved postural awareness requiring less than 0 VC during therapeutic activity/exercisein order to improve work ergonomics and posture.  Progressing, not met    PLAN     Progress scapulothoracic and postural exercises as tolerated.    Janneth Strong, PTA

## 2022-02-04 ENCOUNTER — CLINICAL SUPPORT (OUTPATIENT)
Dept: REHABILITATION | Facility: HOSPITAL | Age: 57
End: 2022-02-04
Payer: COMMERCIAL

## 2022-02-04 DIAGNOSIS — M25.512 BILATERAL SHOULDER PAIN, UNSPECIFIED CHRONICITY: ICD-10-CM

## 2022-02-04 DIAGNOSIS — M25.511 BILATERAL SHOULDER PAIN, UNSPECIFIED CHRONICITY: ICD-10-CM

## 2022-02-04 DIAGNOSIS — R29.898 DECREASED STRENGTH OF UPPER EXTREMITY: ICD-10-CM

## 2022-02-04 PROCEDURE — 97110 THERAPEUTIC EXERCISES: CPT | Mod: CQ

## 2022-02-04 PROCEDURE — 97012 MECHANICAL TRACTION THERAPY: CPT | Mod: CQ

## 2022-02-04 PROCEDURE — 97140 MANUAL THERAPY 1/> REGIONS: CPT | Mod: CQ

## 2022-02-07 ENCOUNTER — CLINICAL SUPPORT (OUTPATIENT)
Dept: REHABILITATION | Facility: HOSPITAL | Age: 57
End: 2022-02-07
Payer: COMMERCIAL

## 2022-02-07 DIAGNOSIS — M25.512 BILATERAL SHOULDER PAIN, UNSPECIFIED CHRONICITY: ICD-10-CM

## 2022-02-07 DIAGNOSIS — R29.898 DECREASED STRENGTH OF UPPER EXTREMITY: ICD-10-CM

## 2022-02-07 DIAGNOSIS — M25.511 BILATERAL SHOULDER PAIN, UNSPECIFIED CHRONICITY: ICD-10-CM

## 2022-02-07 PROCEDURE — 97012 MECHANICAL TRACTION THERAPY: CPT | Mod: CQ

## 2022-02-07 PROCEDURE — 97110 THERAPEUTIC EXERCISES: CPT | Mod: CQ

## 2022-02-07 PROCEDURE — 97140 MANUAL THERAPY 1/> REGIONS: CPT | Mod: CQ,59

## 2022-02-07 NOTE — PROGRESS NOTES
[facility]   Physical Therapy Treatment Note     Name: Brianna Goyal [facility]  Clinic Number: [id number]    Therapy Diagnosis:   Encounter Diagnoses   Name Primary?    Bilateral shoulder pain, unspecified chronicity     Decreased strength of upper extremity      Physician: Hola Curiel*    Visit Date: 2/7/2022    Physician Orders: PT Eval and Treat   Medical Diagnosis from Referral: M79.7 (ICD-10-CM) - Fibromyalgia   Evaluation Date: 1/4/2022  Authorization Period Expiration: 12/31/2022  Plan of Care Expiration: 3/4/2022  Progress Note Due: 10th visit  Visit # / Visits authorized: 9 / 20 (new referral); total visits 21    PTA Visit #: 5 / 5     Time In: 1612 - patient presented to clinic 12 minutes late  Time Out: 1727  Total Treatment Time: 65 minutes + 10 minutes of heat (unsupervised)  Total Billable Time: 53 minutes (3 TE, 1 MT, 1 Mech Tx)    SUBJECTIVE     Patient reports: that she feels better today. States that the numbness and tingling in her Left lower arm has not occurred since previous visit. Patient reports watching the way she is positioning her arm to avoid symptoms.  She was compliant with home exercise program.  Response to previous treatment: good, no adverse reaction; appropriate muscle response  Functional change: able to attend [employer] and [employer] classes    Pain: 0/10, currently  Location: Left shoulder, cervical spine    OBJECTIVE     Objective Measures updated at progress report unless specified.     Treatment     Brianna received the treatments listed below:      Manual therapy techniques to improve mobility and increase tissue extensibility for 10 minutes:  - Suboccipital Release  - Cervical distraction  - Manual Left upper trap stretch: 30 second hold, 3x    Therapeutic exercises to develop strength, endurance and ROM for 43 minutes including:    Aerobic Activity: UBE x 6 minutes (3 fwd/3 bwd), Lvl 5  Chin tucks: 5 seconds, 2 x 15  Open books: 2 x 10,  5 second hold each side  Thread the needle in quadruped: 10x - 5lb dumbbell in Right hand to avoid excessive wrist extension  Cat/Camel: 5 second hold, 15x - 5lb dumbbell in Right hand to avoid excessive wrist extension    No Money's with back to wall + chin tuck: Red TheraBand, 2 x 10 with a 5 second hold  Standing bilateral shoulder extension with Green TheraBand: 5 second hold, 3 x 10  Prone Ys/Ts/Is: 1lb, 2 x 10 each UE - emphasis on scapular retraction prior to lift  Prone W's: 5 seconds, 2 x 15     Modalities: Supervised Mechanical Cervical Traction, after being cleared for contraindications: 15lbs for 5 minutes, 2 minute rest, 18 lbs for 5 minutes for a total of 12 minutes. Patient denied any adverse effects. Traction machine placed on middle level (20 degrees) to target C3-C7.    Moist heat pack applied to cervical spine in supine post session x 10 minutes for pain reduction.    Patient Education and Home Exercises     Home Exercises Provided and Patient Education Provided     Education provided: 1/28/2022  - Updated HEP to include:  Standing L QL stretch: 10 second hold, 10x  Seated lumbar flexion stretch: 5 second hold, 2 x 10  SL open books: 5 second hold, 2 x 10  - Continued compliance with her HEP to reduce pain and increase tissue extensibility    Written Home Exercises Provided: Patient instructed to cont prior HEP in addition to new exercises provided today (1/28/2022). Exercises were reviewed and Brianna was able to demonstrate them prior to the end of the session.  Brianna demonstrated good  understanding of the education provided. See EMR under Patient Instructions for exercises provided during therapy sessions    ASSESSMENT     Patient with good tolerance to treatment session noting no exacerbation of symptoms with exercise. She demonstrated improved tolerance to mechanical traction this session.   Brianna Is progressing well towards her goals.   Pt prognosis is Good.     Pt will continue to benefit  from skilled outpatient physical therapy to address the deficits listed in the problem list box on initial evaluation, provide pt/family education and to maximize pt's level of independence in the home and community environment.     Pt's spiritual, cultural and educational needs considered and pt agreeable to plan of care and goals.     Anticipated barriers to physical therapy: none    Goals:  New Short Term Goals Status:  4 weeks  1. Patient will report attending at least one [employer] class to demonstrates return to PLOF. Progressing, not met  2. Patient will report pain free cervical active range of motion to demonstrates improved tolerance for active cervical rotation and side bend. Progressing, not met     Previous Short Term Goals Status: Progressing  Short Term Goals (4 Weeks):   1. Patient will be independent with home exercise program to supplement therapy sessions in improving pain free mobility. MET  2. Patient will improve upper extremity manual muscle tests by 1/2 grade in all planes to improve strength for Oh mobility. Progressing, not met  3. Pt will demonstrate improved postural awareness requiring less than 3 VC during therapeutic activity/exercisein order to improve work ergonomics and posture.  MET     Long Term Goals (8 Weeks):   1. Patient will improve FOTO Survey score to </= 30% limitation to improve perceived limitation with changing and maintaining body positions.  Progressing, not met  2. Patient will improve upper extremity manual muscle tests 1 grade in all planes to improve strength for lifting and carrying tasks.  Progressing, not met  3. Patient will report no pain with cervical active range of motion in all planes to promote unlimited functional mobility. Progressing, not met  4. Patient will report no pain with OH movements to demonstrates improved symptom tolerance. Progressing, not met  5. Pt will demonstrate improved perscapular strength and endurance to show improved OH mobility and  activity tolerance.  Progressing, not met  6. Pt will demonstrate improved postural awareness requiring less than 0 VC during therapeutic activity/exercisein order to improve work ergonomics and posture.  Progressing, not met    PLAN     Progress scapulothoracic and postural exercises as tolerated.    Janneth Strong, PTA

## 2022-02-10 ENCOUNTER — PATIENT MESSAGE (OUTPATIENT)
Dept: REHABILITATION | Facility: HOSPITAL | Age: 57
End: 2022-02-10
Payer: COMMERCIAL

## 2022-02-15 ENCOUNTER — CLINICAL SUPPORT (OUTPATIENT)
Dept: REHABILITATION | Facility: HOSPITAL | Age: 57
End: 2022-02-15
Payer: COMMERCIAL

## 2022-02-15 DIAGNOSIS — M25.511 BILATERAL SHOULDER PAIN, UNSPECIFIED CHRONICITY: ICD-10-CM

## 2022-02-15 DIAGNOSIS — M25.512 BILATERAL SHOULDER PAIN, UNSPECIFIED CHRONICITY: ICD-10-CM

## 2022-02-15 DIAGNOSIS — R29.898 DECREASED STRENGTH OF UPPER EXTREMITY: ICD-10-CM

## 2022-02-15 PROCEDURE — 97110 THERAPEUTIC EXERCISES: CPT

## 2022-02-15 NOTE — PROGRESS NOTES
[facility]   Physical Therapy Treatment Note     Name: Brianna Goyal [facility] Number: [id number]    Therapy Diagnosis:   Encounter Diagnoses   Name Primary?    Bilateral shoulder pain, unspecified chronicity     Decreased strength of upper extremity      Physician: Hola Curiel*    Visit Date: 2/15/2022    Physician Orders: PT Eval and Treat   Medical Diagnosis from Referral: M79.7 (ICD-10-CM) - Fibromyalgia   Evaluation Date: 1/4/2022  Authorization Period Expiration: 12/31/2022  Plan of Care Expiration: 3/4/2022  Progress Note Due: 10th visit  Visit # / Visits authorized: 10 / 20 (new referral); total visits 21    PTA Visit #: 0 / 5     Time In: 3:15 pm  Time Out: 4:00 pm  Total Treatment Time: 45 minutes  Total Billable Time: 45 minutes (3 TE)    SUBJECTIVE     Patient reports: that she feels very good today and agrees she is ready for discharge. Patient reports she is very pleased with her progress.  She was compliant with home exercise program.  Response to previous treatment: good, no adverse reaction; appropriate muscle response  Functional change: attending bhupendra 2x per week     Pain: 0/10, currently  Location: Left shoulder, cervical spine    OBJECTIVE     Objective Measures updated at progress report unless specified.     Treatment     Brianna received the treatments listed below:      Manual therapy techniques to improve mobility and increase tissue extensibility for 00 minutes:  - Suboccipital Release  - Cervical distraction  - Manual Left upper trap stretch: 30 second hold, 3x    Therapeutic exercises to develop strength, endurance and ROM for 45 minutes including:    Aerobic Activity: UBE x 6 minutes (3 fwd/3 bwd), Lvl 5  Chin tucks: 5 seconds, 2 x 15  Open books: 2 x 10, 5 second hold each side  Thread the needle in quadruped: 10x - 5lb dumbbell in Right hand to avoid excessive wrist extension  Cat/Camel: 5 second hold, 15x - 5lb dumbbell in Right hand to  avoid excessive wrist extension    No Money's with back to wall + chin tuck: Red TheraBand, 2 x 10 with a 10 second hold  Standing bilateral shoulder extension with Green TheraBand: 5 second hold, 3 x 10  Prone Ys/Ts/Is: 1lb, 2 x 10 each UE - emphasis on scapular retraction prior to lift  Prone W's: 5 seconds, 2 x 15     Modalities: Supervised Mechanical Cervical Traction, after being cleared for contraindications: 15lbs for 5 minutes, 2 minute rest, 18 lbs for 5 minutes for a total of 00 minutes. Patient denied any adverse effects. Traction machine placed on middle level (20 degrees) to target C3-C7.      Patient Education and Home Exercises     Home Exercises Provided and Patient Education Provided     Education provided: 1/28/2022  - Updated HEP to include:  Standing L QL stretch: 10 second hold, 10x  Seated lumbar flexion stretch: 5 second hold, 2 x 10  SL open books: 5 second hold, 2 x 10  - Continued compliance with her HEP to reduce pain and increase tissue extensibility    Written Home Exercises Provided: Patient instructed to cont prior HEP in addition to new exercises provided today (1/28/2022). Exercises were reviewed and Brianna was able to demonstrate them prior to the end of the session.  Brianna demonstrated good  understanding of the education provided. See EMR under Patient Instructions for exercises provided during therapy sessions    ASSESSMENT   Patient with excellent tolerance for therapeutic exercise demonstrating improved scapulothoracic endurance. Held traction and manual therapy due to patient late arrival. Patient will be discharged next visit.     Brianna Is progressing well towards her goals.   Pt prognosis is Good.     Pt will continue to benefit from skilled outpatient physical therapy to address the deficits listed in the problem list box on initial evaluation, provide pt/family education and to maximize pt's level of independence in the home and community environment.     Pt's spiritual,  cultural and educational needs considered and pt agreeable to plan of care and goals.     Anticipated barriers to physical therapy: none    Goals:  New Short Term Goals Status:  4 weeks  1. Patient will report attending at least one zoomba class to demonstrates return to PLOF. Progressing, not met  2. Patient will report pain free cervical active range of motion to demonstrates improved tolerance for active cervical rotation and side bend. Progressing, not met     Previous Short Term Goals Status: Progressing  Short Term Goals (4 Weeks):   1. Patient will be independent with home exercise program to supplement therapy sessions in improving pain free mobility. MET  2. Patient will improve upper extremity manual muscle tests by 1/2 grade in all planes to improve strength for Oh mobility. Progressing, not met  3. Pt will demonstrate improved postural awareness requiring less than 3 VC during therapeutic activity/exercisein order to improve work ergonomics and posture.  MET     Long Term Goals (8 Weeks):   1. Patient will improve FOTO Survey score to </= 30% limitation to improve perceived limitation with changing and maintaining body positions.  Progressing, not met  2. Patient will improve upper extremity manual muscle tests 1 grade in all planes to improve strength for lifting and carrying tasks.  Progressing, not met  3. Patient will report no pain with cervical active range of motion in all planes to promote unlimited functional mobility. Progressing, not met  4. Patient will report no pain with OH movements to demonstrates improved symptom tolerance. Progressing, not met  5. Pt will demonstrate improved perscapular strength and endurance to show improved OH mobility and activity tolerance.  Progressing, not met  6. Pt will demonstrate improved postural awareness requiring less than 0 VC during therapeutic activity/exercisein order to improve work ergonomics and posture.  Progressing, not met    PLAN     Progress  scapulothoracic and postural exercises as tolerated.    Cecy Bryant, PT

## 2022-02-18 ENCOUNTER — CLINICAL SUPPORT (OUTPATIENT)
Dept: REHABILITATION | Facility: HOSPITAL | Age: 57
End: 2022-02-18
Payer: COMMERCIAL

## 2022-02-18 DIAGNOSIS — M25.511 BILATERAL SHOULDER PAIN, UNSPECIFIED CHRONICITY: ICD-10-CM

## 2022-02-18 DIAGNOSIS — M25.512 BILATERAL SHOULDER PAIN, UNSPECIFIED CHRONICITY: ICD-10-CM

## 2022-02-18 DIAGNOSIS — R29.898 DECREASED STRENGTH OF UPPER EXTREMITY: ICD-10-CM

## 2022-02-18 PROCEDURE — 97110 THERAPEUTIC EXERCISES: CPT

## 2022-02-18 NOTE — PROGRESS NOTES
[facility]   Physical Therapy Treatment Note/ Discharge Summary     Name: Brianna Goyal [facility]  Clinic Number: [id number]    Therapy Diagnosis:   No diagnosis found.  Physician: Hola Curiel*    Visit Date: 2/18/2022    Physician Orders: PT Eval and Treat   Medical Diagnosis from Referral: M79.7 (ICD-10-CM) - Fibromyalgia   Evaluation Date: 1/4/2022  Authorization Period Expiration: 12/31/2022  Plan of Care Expiration: 3/4/2022  Progress Note Due: 10th visit  Visit # / Visits authorized: 11 / 20 (new referral); total visits 22   Date of Last visit: 2/18/2022  Total Visits Received: 22      Time In: 12:15 pm  Time Out: 1:00 pm  Total Treatment Time: 45 minutes  Total Billable Time: 45 minutes (3 TE)    SUBJECTIVE     Patient reports: that she feels very good today and agrees she is ready for discharge. Patient reports she is very pleased with her progress.  She was compliant with home exercise program.  Response to previous treatment: good, no adverse reaction; appropriate muscle response  Functional change: attending bhupendra 2x per week     Pain: 0/10, currently  Location: Left shoulder, cervical spine    OBJECTIVE     Objective Measures updated at progress report unless specified.     Treatment     Brianna received the treatments listed below:      Therapeutic exercises to develop strength, endurance and ROM for 45 minutes including:    Chin tucks: 5 seconds, 2 x 15  Chin tucks + cervical rotation: 2x10  Open books: 2 x 10, 10 second hold each side  Thread the needle in quadruped: 10x - 5lb dumbbell in Right hand to avoid excessive wrist extension  Cat/Camel: 5 second hold, 15x - 5lb dumbbell in Right hand to avoid excessive wrist extension    No Money's with back to wall + chin tuck: Red TheraBand, 2 x 10 with a 10 second hold  Standing bilateral shoulder extension with Green TheraBand: 5 second hold, 3 x 10  Prone Ys/Ts/Is: 1lb, 2 x 10 each UE - emphasis on scapular  retraction prior to lift  Prone W's: 5 seconds, 2 x 15     Patient Education and Home Exercises     Home Exercises Provided and Patient Education Provided     Education provided: 1/28/2022  - Updated HEP to include:  Standing L QL stretch: 10 second hold, 10x  Seated lumbar flexion stretch: 5 second hold, 2 x 10  SL open books: 5 second hold, 2 x 10  - Continued compliance with her HEP to reduce pain and increase tissue extensibility    Written Home Exercises Provided: Patient instructed to cont prior HEP in addition to new exercises provided today (1/28/2022). Exercises were reviewed and Brianna was able to demonstrate them prior to the end of the session.  Brianna demonstrated good  understanding of the education provided. See EMR under Patient Instructions for exercises provided during therapy sessions    ASSESSMENT   Patient has met all short term and long term physical therapy goals at this time. Patient demonstrates pain free active bilateral shoulder and cervical spine range of motion.  Patient has returned to full independence with all household and personal ADL's at this time. Patient will be discharged with an updated HEP.     Discharge reason: Patient has met all of his/her goals    Pt's spiritual, cultural and educational needs considered and pt agreeable to plan of care and goals.     Anticipated barriers to physical therapy: none    Goals:  New Short Term Goals Status:  4 weeks  1. Patient will report attending at least one zoomba class to demonstrates return to PLOF. Progressing, not met  2. Patient will report pain free cervical active range of motion to demonstrates improved tolerance for active cervical rotation and side bend. Progressing, not met     Previous Short Term Goals Status: Progressing  Short Term Goals (4 Weeks):   1. Patient will be independent with home exercise program to supplement therapy sessions in improving pain free mobility. MET  2. Patient will improve upper extremity manual  muscle tests by 1/2 grade in all planes to improve strength for Oh mobility. Progressing, not met  3. Pt will demonstrate improved postural awareness requiring less than 3 VC during therapeutic activity/exercisein order to improve work ergonomics and posture.  MET     Long Term Goals (8 Weeks):   1. Patient will improve FOTO Survey score to </= 30% limitation to improve perceived limitation with changing and maintaining body positions.  Progressing, not met  2. Patient will improve upper extremity manual muscle tests 1 grade in all planes to improve strength for lifting and carrying tasks.  MET  3. Patient will report no pain with cervical active range of motion in all planes to promote unlimited functional mobility. MET  4. Patient will report no pain with OH movements to demonstrates improved symptom tolerance. MET  5. Pt will demonstrate improved perscapular strength and endurance to show improved OH mobility and activity tolerance.  MET  6. Pt will demonstrate improved postural awareness requiring less than 0 VC during therapeutic activity/exercisein order to improve work ergonomics and posture.MET    PLAN     This patient is discharged from Physical Therapy    Cecy Bryant, PT

## 2022-07-19 ENCOUNTER — OFFICE VISIT (OUTPATIENT)
Dept: URGENT CARE | Facility: CLINIC | Age: 57
End: 2022-07-19
Payer: COMMERCIAL

## 2022-07-19 VITALS
HEIGHT: 66 IN | RESPIRATION RATE: 18 BRPM | HEART RATE: 71 BPM | OXYGEN SATURATION: 95 % | BODY MASS INDEX: 28.12 KG/M2 | TEMPERATURE: 97 F | SYSTOLIC BLOOD PRESSURE: 113 MMHG | DIASTOLIC BLOOD PRESSURE: 74 MMHG | WEIGHT: 175 LBS

## 2022-07-19 DIAGNOSIS — R51.9 HEADACHE IN FRONT OF HEAD: ICD-10-CM

## 2022-07-19 DIAGNOSIS — J01.90 ACUTE BACTERIAL SINUSITIS: Primary | ICD-10-CM

## 2022-07-19 DIAGNOSIS — B96.89 ACUTE BACTERIAL SINUSITIS: Primary | ICD-10-CM

## 2022-07-19 LAB
CTP QC/QA: YES
SARS-COV-2 RDRP RESP QL NAA+PROBE: NEGATIVE

## 2022-07-19 PROCEDURE — 3008F BODY MASS INDEX DOCD: CPT | Mod: CPTII,S$GLB,, | Performed by: NURSE PRACTITIONER

## 2022-07-19 PROCEDURE — 1159F PR MEDICATION LIST DOCUMENTED IN MEDICAL RECORD: ICD-10-PCS | Mod: CPTII,S$GLB,, | Performed by: NURSE PRACTITIONER

## 2022-07-19 PROCEDURE — 99204 PR OFFICE/OUTPT VISIT, NEW, LEVL IV, 45-59 MIN: ICD-10-PCS | Mod: S$GLB,,, | Performed by: NURSE PRACTITIONER

## 2022-07-19 PROCEDURE — U0002 COVID-19 LAB TEST NON-CDC: HCPCS | Mod: QW,S$GLB,, | Performed by: NURSE PRACTITIONER

## 2022-07-19 PROCEDURE — U0002: ICD-10-PCS | Mod: QW,S$GLB,, | Performed by: NURSE PRACTITIONER

## 2022-07-19 PROCEDURE — 3074F PR MOST RECENT SYSTOLIC BLOOD PRESSURE < 130 MM HG: ICD-10-PCS | Mod: CPTII,S$GLB,, | Performed by: NURSE PRACTITIONER

## 2022-07-19 PROCEDURE — 3008F PR BODY MASS INDEX (BMI) DOCUMENTED: ICD-10-PCS | Mod: CPTII,S$GLB,, | Performed by: NURSE PRACTITIONER

## 2022-07-19 PROCEDURE — 3078F PR MOST RECENT DIASTOLIC BLOOD PRESSURE < 80 MM HG: ICD-10-PCS | Mod: CPTII,S$GLB,, | Performed by: NURSE PRACTITIONER

## 2022-07-19 PROCEDURE — 99204 OFFICE O/P NEW MOD 45 MIN: CPT | Mod: S$GLB,,, | Performed by: NURSE PRACTITIONER

## 2022-07-19 PROCEDURE — 3078F DIAST BP <80 MM HG: CPT | Mod: CPTII,S$GLB,, | Performed by: NURSE PRACTITIONER

## 2022-07-19 PROCEDURE — 1159F MED LIST DOCD IN RCRD: CPT | Mod: CPTII,S$GLB,, | Performed by: NURSE PRACTITIONER

## 2022-07-19 PROCEDURE — 3074F SYST BP LT 130 MM HG: CPT | Mod: CPTII,S$GLB,, | Performed by: NURSE PRACTITIONER

## 2022-07-19 RX ORDER — AMOXICILLIN AND CLAVULANATE POTASSIUM 875; 125 MG/1; MG/1
1 TABLET, FILM COATED ORAL EVERY 12 HOURS
Qty: 14 TABLET | Refills: 0 | Status: SHIPPED | OUTPATIENT
Start: 2022-07-19 | End: 2022-07-26

## 2022-07-19 NOTE — PROGRESS NOTES
"Subjective:       Patient ID: Brianna Devine is a 57 y.o. female.    Vitals:  height is 5' 6" (1.676 m) and weight is 79.4 kg (175 lb). Her axillary temperature is 96.5 °F (35.8 °C). Her blood pressure is 113/74 and her pulse is 71. Her respiration is 18 and oxygen saturation is 95%.     Chief Complaint: Headache (5 days of flu like symptoms...2 home tests were negative for covid.. - Entered by patient)    This is a 57 y.o. female who presents today with a chief complaint of sinus symptoms that began on Sunday and she sued otc meds for treatment but no relief.      Headache   This is a new problem. The current episode started in the past 7 days. The problem occurs constantly. The problem has been unchanged. The pain is located in the frontal region. The pain does not radiate. The pain quality is not similar to prior headaches. The quality of the pain is described as aching, shooting and sharp. The pain is at a severity of 7/10. Associated symptoms include dizziness, muscle aches, sinus pressure and weakness. Associated symptoms comments: Chills, post nasal drip. Nothing aggravates the symptoms. She has tried acetaminophen and NSAIDs (mucinex) for the symptoms. The treatment provided no relief.       HENT: Positive for sinus pressure.    Neurological: Positive for dizziness and headaches.       Objective:      Physical Exam   HENT:   Head: Normocephalic and atraumatic.   Ears:   Right Ear: Tympanic membrane, external ear and ear canal normal.   Left Ear: Tympanic membrane, external ear and ear canal normal.   Nose: Right sinus exhibits maxillary sinus tenderness and frontal sinus tenderness.   Eyes: Extraocular movement intact   Pulmonary/Chest: Effort normal. No respiratory distress.   Abdominal: Normal appearance.   Neurological: She is alert.   Nursing note and vitals reviewed.        Results for orders placed or performed in visit on 07/19/22   POCT COVID-19 Rapid Screening   Result Value Ref Range    " "POC Rapid COVID Negative Negative     Acceptable Yes      Assessment:       1. Acute bacterial sinusitis    2. Headache in front of head          Plan:     Covid negative      Acute bacterial sinusitis  -     amoxicillin-clavulanate 875-125mg (AUGMENTIN) 875-125 mg per tablet; Take 1 tablet by mouth every 12 (twelve) hours. for 7 days  Dispense: 14 tablet; Refill: 0    Headache in front of head  -     POCT COVID-19 Rapid Screening                  Patient Instructions                                                             Sinusitis   If your condition worsens or fails to improve we recommend that you receive another evaluation at the ER immediately or contact your PCP to discuss your concerns or return here. You must understand that you've received an urgent care treatment only and that you may be released before all your medical problems are known or treated. You the patient will arrange for followup care as instructed.   If we discussed that I think your illness is viral it will not respond to antibiotics and it will last 10-14 days. However, if over the next few days the symptoms worsen start the antibiotics I have given you.   -  If we discussed that you require antibiotics start them now and take them to completion.   -  If you are female and on BCP and do take the antibiotics, use additional methods to prevent pregnancy while on the antibiotics and for one cycle after.   -  Flonase (fluticasone) is a nasal spray which is available over the counter and may help with your symptoms   -  Zyrtec D, Claritin D or allegra D can also help with symptoms of congestion and drainage.   -  If you have hypertension avoid using the "D" which is the decongestant. Instead, you can use Coricidin HBP for your cold and cough symptoms.     -  If you just have drainage you can take plain Zyrtec, Claritin or Allegra   -  If you just have a congested feeling you can take pseudoephedrine (unless you have high blood " pressure) which you have to sign for behind the counter. Do not buy the phenylephrine which is on the shelf as it is not effective   -  Rest and fluids are also important.   -  Tylenol or ibuprofen can also be used as directed for pain unless you have an allergy to them or medical condition such as stomach ulcers, kidney or liver disease or blood thinners etc for which you should not be taking these type of medications.   -  If you are flying in the next few days Afrin nose drops for the airplane flight upon take off and landing may help. Other than at those times refrain from using afrin.   -  If you were prescribed a narcotic do not drive or operate heavy machinery while taking these medications.

## 2023-07-03 ENCOUNTER — HOSPITAL ENCOUNTER (OUTPATIENT)
Dept: RADIOLOGY | Facility: HOSPITAL | Age: 58
Discharge: HOME | End: 2023-07-03
Attending: INTERNAL MEDICINE
Payer: COMMERCIAL

## 2023-07-03 ENCOUNTER — OFFICE VISIT (OUTPATIENT)
Dept: PRIMARY CARE CLINIC | Facility: CLINIC | Age: 58
End: 2023-07-03
Payer: COMMERCIAL

## 2023-07-03 ENCOUNTER — TELEPHONE (OUTPATIENT)
Dept: PRIMARY CARE CLINIC | Facility: CLINIC | Age: 58
End: 2023-07-03
Payer: COMMERCIAL

## 2023-07-03 VITALS
TEMPERATURE: 98 F | RESPIRATION RATE: 16 BRPM | WEIGHT: 171.94 LBS | HEART RATE: 81 BPM | SYSTOLIC BLOOD PRESSURE: 116 MMHG | HEIGHT: 66 IN | BODY MASS INDEX: 27.63 KG/M2 | OXYGEN SATURATION: 96 % | DIASTOLIC BLOOD PRESSURE: 80 MMHG

## 2023-07-03 DIAGNOSIS — M19.90 OSTEOARTHRITIS, UNSPECIFIED OSTEOARTHRITIS TYPE, UNSPECIFIED SITE: ICD-10-CM

## 2023-07-03 DIAGNOSIS — M79.7 FIBROMYALGIA: ICD-10-CM

## 2023-07-03 DIAGNOSIS — M81.0 OSTEOPOROSIS, UNSPECIFIED OSTEOPOROSIS TYPE, UNSPECIFIED PATHOLOGICAL FRACTURE PRESENCE: ICD-10-CM

## 2023-07-03 DIAGNOSIS — I83.93 VARICOSE VEINS OF BOTH LOWER EXTREMITIES, UNSPECIFIED WHETHER COMPLICATED: ICD-10-CM

## 2023-07-03 DIAGNOSIS — R93.5 ABNORMAL X-RAY OF ABDOMEN: Primary | ICD-10-CM

## 2023-07-03 DIAGNOSIS — M70.62 TROCHANTERIC BURSITIS OF LEFT HIP: ICD-10-CM

## 2023-07-03 DIAGNOSIS — R93.5 ABNORMAL X-RAY OF ABDOMEN: ICD-10-CM

## 2023-07-03 PROCEDURE — 3008F PR BODY MASS INDEX (BMI) DOCUMENTED: ICD-10-PCS | Mod: CPTII,S$GLB,, | Performed by: INTERNAL MEDICINE

## 2023-07-03 PROCEDURE — 1159F PR MEDICATION LIST DOCUMENTED IN MEDICAL RECORD: ICD-10-PCS | Mod: CPTII,S$GLB,, | Performed by: INTERNAL MEDICINE

## 2023-07-03 PROCEDURE — 74019 RADEX ABDOMEN 2 VIEWS: CPT | Mod: TC,PN

## 2023-07-03 PROCEDURE — 99204 PR OFFICE/OUTPT VISIT, NEW, LEVL IV, 45-59 MIN: ICD-10-PCS | Mod: S$GLB,,, | Performed by: INTERNAL MEDICINE

## 2023-07-03 PROCEDURE — 3008F BODY MASS INDEX DOCD: CPT | Mod: CPTII,S$GLB,, | Performed by: INTERNAL MEDICINE

## 2023-07-03 PROCEDURE — 74019 XR ABDOMEN FLAT AND ERECT: ICD-10-PCS | Mod: 26,,, | Performed by: RADIOLOGY

## 2023-07-03 PROCEDURE — 3074F PR MOST RECENT SYSTOLIC BLOOD PRESSURE < 130 MM HG: ICD-10-PCS | Mod: CPTII,S$GLB,, | Performed by: INTERNAL MEDICINE

## 2023-07-03 PROCEDURE — 3079F PR MOST RECENT DIASTOLIC BLOOD PRESSURE 80-89 MM HG: ICD-10-PCS | Mod: CPTII,S$GLB,, | Performed by: INTERNAL MEDICINE

## 2023-07-03 PROCEDURE — 73560 X-RAY EXAM OF KNEE 1 OR 2: CPT | Mod: TC,PN,RT

## 2023-07-03 PROCEDURE — 1159F MED LIST DOCD IN RCRD: CPT | Mod: CPTII,S$GLB,, | Performed by: INTERNAL MEDICINE

## 2023-07-03 PROCEDURE — 73560 XR KNEE 1 OR 2 VIEW RIGHT: ICD-10-PCS | Mod: 26,RT,, | Performed by: RADIOLOGY

## 2023-07-03 PROCEDURE — 1160F PR REVIEW ALL MEDS BY PRESCRIBER/CLIN PHARMACIST DOCUMENTED: ICD-10-PCS | Mod: CPTII,S$GLB,, | Performed by: INTERNAL MEDICINE

## 2023-07-03 PROCEDURE — 99204 OFFICE O/P NEW MOD 45 MIN: CPT | Mod: S$GLB,,, | Performed by: INTERNAL MEDICINE

## 2023-07-03 PROCEDURE — 1160F RVW MEDS BY RX/DR IN RCRD: CPT | Mod: CPTII,S$GLB,, | Performed by: INTERNAL MEDICINE

## 2023-07-03 PROCEDURE — 99999 PR PBB SHADOW E&M-EST. PATIENT-LVL V: ICD-10-PCS | Mod: PBBFAC,,, | Performed by: INTERNAL MEDICINE

## 2023-07-03 PROCEDURE — 99999 PR PBB SHADOW E&M-EST. PATIENT-LVL V: CPT | Mod: PBBFAC,,, | Performed by: INTERNAL MEDICINE

## 2023-07-03 PROCEDURE — 73560 X-RAY EXAM OF KNEE 1 OR 2: CPT | Mod: 26,RT,, | Performed by: RADIOLOGY

## 2023-07-03 PROCEDURE — 3079F DIAST BP 80-89 MM HG: CPT | Mod: CPTII,S$GLB,, | Performed by: INTERNAL MEDICINE

## 2023-07-03 PROCEDURE — 74019 RADEX ABDOMEN 2 VIEWS: CPT | Mod: 26,,, | Performed by: RADIOLOGY

## 2023-07-03 PROCEDURE — 3074F SYST BP LT 130 MM HG: CPT | Mod: CPTII,S$GLB,, | Performed by: INTERNAL MEDICINE

## 2023-07-03 RX ORDER — NAPROXEN SODIUM 220 MG
220 TABLET ORAL
COMMUNITY

## 2023-07-03 NOTE — TELEPHONE ENCOUNTER
----- Message from Ana Maria Koch MD sent at 7/2/2023  7:49 AM CDT -----  This pt has no PCP. Not sure how she got scheduled for me    Dr. SEYMOUR

## 2023-07-03 NOTE — PROGRESS NOTES
I sent pt a my chart message -  I reviewed your Xray of your Right knee which showed Mild Degenerative Joint disease (Osteoarthritis)  Dr. SEYMOUR

## 2023-07-03 NOTE — PROGRESS NOTES
[facility] Note    Chief Complaint      Chief Complaint   Patient presents with    leg pain     Was told that she has IT band syndrome by Chiropractor      swollen/stiff robert hands and finger     For about 3 weeks    abnormal finding on xray     Was told by chiropractor that was a spot near her left kidney/abd area. Rec that she discuss with someone in primary care       History of Present Illness      Brianna Devine is a 58 y.o. F with FM, Osteoporosis, OA Varicose veins presnts for same day for c//o abn Xray abd and Arthralgias.     Needs referral to a new PCP accepting new pts.     Osteoporosis - Pt was Rx Fosamax by her OB but has not yet started it. I reviewed your DEXA/Bone Density which showed  - Osteoporosis.  I recommend you start OTC Calcium max 5511-8990 mg/d and Vit D3 2000 units/d OTC.  In addition, I rec weight-bearing  exercise at least 30 minutes 3 times/wk and ideally 5 times/wk.  No specific intensity.  Walking is fine. I just rec you pick a form of weight-bearing exercise you enjoy to facilitate long term compliance and benefit. . I would not rec if you are planning dental work.  You would need to take 30 min before food/beverage/other meds.  You would need to stay Upright for 30 minutes after taking. Let me know your thoughts. Can repeat DEXA in 2yrs.    Low back pain - Pt reports being told by her Chiropractor there was an abnormality seen in L spine Xray in her left abdominal region. She denies any Left abdominal Pain. She was dx with IT band Synd.     FM - Prev seen by rheum, Dr. Curiel. Did well s/p FM. She never tried the Cymbalta. Xray hand and C-spine '21 - OA/DDD. She did well s/p Ptx. She does Jazzercise 3 times/wk. She noticed inc stiffness in May baldo in her lower back.    Varicose Veins - Fu by Dr. Burks. Rec compression stockings, elev BLE prn, and low sodium diet.     HSV - Pt on Valtrex prn.     +ANTONIO - ANTONIO profile neg. Prev ESR, CRP, RF, and  anti-CCP - neg.     +HCV Ab - HCV RNA - neg. - 10/12/21    Patient Care Team:  Primary Doctor No as PCP - General  Jayne Stanford MA as Care Coordinator     Health Maintenance:  Immunization History   Administered Date(s) Administered    COVID-19, vector-nr, rS-Ad26, PF (Lesly) 03/04/2021    Tdap 10/29/2015      Health Maintenance   Topic Date Due    Mammogram  08/15/2023    Lipid Panel  06/26/2024    TETANUS VACCINE  10/29/2025    Hepatitis C Screening  Completed        Past Medical History:  Past Medical History:   Diagnosis Date    Chronic neck pain 10/29/2015    HCV antibody positive 10/29/2015    11/2015 viral load negative    History of shingles     2015    Overweight (BMI 25.0-29.9) 10/29/2015    Varicose veins of both lower extremities 10/29/2015    treated at [facility]       Past Surgical History:   has a past surgical history that includes Endometrial ablation; Colonoscopy (N/A, 3/29/2016); and Endoscopic vein laser treatment (Left, 2017).    Family History:  family history includes Breast cancer in her sister and sister; Colon cancer (age of onset: 75) in her father; Heart failure in her father; Hypertension in her mother; Other in her sister.     Social History:  Social History     Tobacco Use    Smoking status: Never    Smokeless tobacco: Never   Substance Use Topics    Alcohol use: Yes     Comment: occasionally     Drug use: Never       Review of Systems   Constitutional:  Negative for chills, diaphoresis and fever.   HENT:  Negative for hearing loss.    Eyes:  Positive for visual disturbance.        Wears glasses.    Respiratory:  Negative for chest tightness and shortness of breath.    Cardiovascular:  Positive for leg swelling. Negative for chest pain and palpitations.   Gastrointestinal:  Negative for abdominal pain, constipation, diarrhea, nausea and vomiting.   Endocrine: Negative for cold intolerance, heat intolerance and polydipsia.   Genitourinary:  Negative for bladder  "incontinence, dysuria and frequency.   Musculoskeletal:  Positive for arthralgias, back pain, leg pain and myalgias. Negative for neck pain.        When  she awakens she feels stiffness in her hands/shoulders. Right knee down to her ankle - can't get comfortable at night. RT buttock pain - massages help. She is in Massage Therapy. Uses ice prn. Has HEP. She takes aleve or advil prn - does not take together. "All over soreness"   Neurological:  Positive for weakness. Negative for dizziness, numbness and headaches.        Occ drops things. Reports hand  is weaker than it used to be.    Psychiatric/Behavioral:  Negative for dysphoric mood. The patient is not nervous/anxious.         Work is stressful. Works 50 hr work weeks. 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 Prn, Disp: , Rfl:     valACYclovir (VALTREX) 1000 MG tablet, Take 1 tablet by mouth as needed. , Disp: , Rfl:     vitamin E acetate (VITAMIN E ORAL), Take by mouth Daily., Disp: , Rfl:      Allergies:  Review of patient's allergies indicates:   Allergen Reactions    Sulfa (sulfonamide antibiotics) Hives       Physical Exam      Vital Signs  Temp: 98.2 °F (36.8 °C)  Temp Source: Oral  Pulse: 81  Resp: 16  SpO2: 96 %  BP: 116/80  BP Location: Right arm  Patient Position: Sitting  Pain Score:   7  Pain Loc: Leg  Height and Weight  Height: 5' 6" (167.6 cm)  Weight: 78 kg (171 lb 15.3 oz)  BSA (Calculated - sq m): 1.91 sq meters  BMI (Calculated): 27.8  Weight in (lb) to have BMI = 25: 154.6      Patient Position: Sitting      Physical Exam  Vitals reviewed.   Constitutional:       " General: She is not in acute distress.     Appearance: Normal appearance. She is not ill-appearing, toxic-appearing or diaphoretic.   HENT:      Head: Normocephalic and atraumatic.      Right Ear: Tympanic membrane normal.      Left Ear: Tympanic membrane normal.   Eyes:      Extraocular Movements: Extraocular movements intact.      Conjunctiva/sclera: Conjunctivae normal.      Pupils: Pupils are equal, round, and reactive to light.   Neck:      Vascular: No carotid bruit.   Cardiovascular:      Rate and Rhythm: Normal rate and regular rhythm.      Pulses: Normal pulses.      Heart sounds: Normal heart sounds.   Pulmonary:      Effort: No respiratory distress.      Breath sounds: Normal breath sounds. No wheezing.   Abdominal:      General: Bowel sounds are normal. There is no distension.      Palpations: Abdomen is soft. There is no mass.      Tenderness: There is no abdominal tenderness. There is no guarding or rebound.      Hernia: No hernia is present.   Musculoskeletal:      Comments: + warmth and trace swelling to RT knee; No erythema.  + Pain on extension of RT knee;  + TTP over Left  greater trochanter.  NTTP over RT greater trochanter.  + Pain on int rotation of RT hip. +arthritic changes to robert hands   Skin:     General: Skin is warm.   Neurological:      General: No focal deficit present.      Mental Status: She is alert and oriented to person, place, and time.   Psychiatric:         Mood and Affect: Mood normal.         Behavior: Behavior normal.        Laboratory:  CBC:  Recent Labs   Lab 09/28/21  1056   WBC 5.90   RBC 4.55   Hemoglobin 13.2   Hematocrit 40.1   Platelets 237   MCV 88   MCH 29.0   MCHC 32.9       CMP:  Recent Labs   Lab 09/28/21  1056   Glucose 76   Calcium 9.3   Albumin 3.7   Total Protein 7.0   Sodium 141   Potassium 4.3   CO2 20 L   Chloride 107   BUN 15   Creatinine 0.8   Alkaline Phosphatase 62   ALT 14   AST 20   Total Bilirubin 0.5       LIPIDS:  Recent Labs   Lab 09/28/21  1056    TSH 2.625       TSH:  Recent Labs   Lab 09/28/21  1056   TSH 2.625       A1C:  Recent Labs   Lab 09/28/21  1056   Hemoglobin A1C 4.9       Other:   Recent Labs   Lab 09/28/21  1056 10/12/21  1430   Vit D, 25-Hydroxy  --  51   Iron 126  --    Transferrin 236  --    TIBC 349  --    Saturated Iron 36  --      Recent Labs   Lab 10/12/21  1430   Hepatitis C Ab Positive A       Assessment/Plan     Brianna Devine is a 58 y.o.female with:    Abnormal x-ray of abdomen  -     X-Ray Abdomen Flat And Erect; Future; Expected date: 07/03/2023  -     Comprehensive Metabolic Panel; Future; Expected date: 07/03/2023  - Will check Xray abdomen and if needed based on findings order further imaging.  Will check CMP.     Osteoarthritis, unspecified osteoarthritis type, unspecified site  -     Sedimentation rate; Future; Expected date: 07/03/2023  -     C-Reactive Protein; Future; Expected date: 07/03/2023  -     Comprehensive Metabolic Panel; Future; Expected date: 07/03/2023  -     X-Ray Knee 1 or 2 View Right; Future; Expected date: 07/03/2023  - Suspect OA.  Check inflammatory markers and Xray RT knee.     Fibromyalgia  - Pt does well with exercise.     Osteoporosis, unspecified osteoporosis type, unspecified pathological fracture presence  - Rec calcium, vit D, and wt bearing exercise. Pt considering starting Fosamax. D/w pt potential S.E.     Trochanteric bursitis of left hip  - Rec stretches. Consider Referral to Sports Med.    Varicose veins of both lower extremities, unspecified whether complicated  - Rec elev BLE, low sodium diet, and compression stockings/socks prn. Fu by Dr. Burks.         Chronic conditions status updated as per HPI.  Other than changes above, cont current medications and maintain follow up with specialists.       Ana Maria Koch MD  [facility]

## 2023-07-03 NOTE — TELEPHONE ENCOUNTER
Pt was seen by someone in primary care in 2021 and scheduled herself via [facility]. I would assume the system automatically flipped the visit type to established pt. Will try to contact her let her know that we are not accepting new patients and offer her an appt with someone who is

## 2023-07-05 NOTE — PROGRESS NOTES
I sent pt a my chart message -  I reviewed your Abdominal Xray results which were normal. I suspect the areas the chiropractor saw on his Xray were the white areas noted on this exam which in discussing with radiology were consistent with cartilage calcifications and do not require any further intervention.   Dr. SEYMOUR
Hold chemical prophylaxis prior to surgery, SCD's okay

## 2023-07-19 ENCOUNTER — OFFICE VISIT (OUTPATIENT)
Dept: PRIMARY CARE CLINIC | Facility: CLINIC | Age: 58
End: 2023-07-19
Payer: COMMERCIAL

## 2023-07-19 VITALS
OXYGEN SATURATION: 99 % | SYSTOLIC BLOOD PRESSURE: 120 MMHG | DIASTOLIC BLOOD PRESSURE: 72 MMHG | WEIGHT: 168.88 LBS | BODY MASS INDEX: 27.14 KG/M2 | HEIGHT: 66 IN | HEART RATE: 83 BPM

## 2023-07-19 DIAGNOSIS — Z00.00 ANNUAL PHYSICAL EXAM: ICD-10-CM

## 2023-07-19 DIAGNOSIS — M81.0 OSTEOPOROSIS, UNSPECIFIED OSTEOPOROSIS TYPE, UNSPECIFIED PATHOLOGICAL FRACTURE PRESENCE: ICD-10-CM

## 2023-07-19 DIAGNOSIS — Z80.0 FAMILY HISTORY OF COLON CANCER: ICD-10-CM

## 2023-07-19 DIAGNOSIS — S69.91XS INJURY OF RIGHT HAND, SEQUELA: ICD-10-CM

## 2023-07-19 DIAGNOSIS — Z80.3 FAMILY HISTORY OF BREAST CANCER: ICD-10-CM

## 2023-07-19 DIAGNOSIS — R53.83 FATIGUE, UNSPECIFIED TYPE: ICD-10-CM

## 2023-07-19 DIAGNOSIS — Z12.12 ENCOUNTER FOR COLORECTAL CANCER SCREENING: ICD-10-CM

## 2023-07-19 DIAGNOSIS — M79.7 FIBROMYALGIA: ICD-10-CM

## 2023-07-19 DIAGNOSIS — R76.8 HCV ANTIBODY POSITIVE: ICD-10-CM

## 2023-07-19 DIAGNOSIS — Z12.11 ENCOUNTER FOR COLORECTAL CANCER SCREENING: ICD-10-CM

## 2023-07-19 DIAGNOSIS — Z79.1 NSAID LONG-TERM USE: ICD-10-CM

## 2023-07-19 DIAGNOSIS — Z76.89 ESTABLISHING CARE WITH NEW DOCTOR, ENCOUNTER FOR: Primary | ICD-10-CM

## 2023-07-19 PROBLEM — Z78.0 MENOPAUSE: Status: ACTIVE | Noted: 2019-04-22

## 2023-07-19 PROBLEM — S69.91XA INJURY OF RIGHT HAND: Status: ACTIVE | Noted: 2023-07-19

## 2023-07-19 PROBLEM — N95.2 ATROPHIC VAGINITIS: Status: ACTIVE | Noted: 2019-04-22

## 2023-07-19 PROBLEM — R93.5 ABNORMAL X-RAY OF ABDOMEN: Status: RESOLVED | Noted: 2023-07-03 | Resolved: 2023-07-19

## 2023-07-19 PROCEDURE — 3078F DIAST BP <80 MM HG: CPT | Mod: CPTII,S$GLB,, | Performed by: STUDENT IN AN ORGANIZED HEALTH CARE EDUCATION/TRAINING PROGRAM

## 2023-07-19 PROCEDURE — 99999 PR PBB SHADOW E&M-EST. PATIENT-LVL V: CPT | Mod: PBBFAC,,, | Performed by: STUDENT IN AN ORGANIZED HEALTH CARE EDUCATION/TRAINING PROGRAM

## 2023-07-19 PROCEDURE — 1159F MED LIST DOCD IN RCRD: CPT | Mod: CPTII,S$GLB,, | Performed by: STUDENT IN AN ORGANIZED HEALTH CARE EDUCATION/TRAINING PROGRAM

## 2023-07-19 PROCEDURE — 3008F PR BODY MASS INDEX (BMI) DOCUMENTED: ICD-10-PCS | Mod: CPTII,S$GLB,, | Performed by: STUDENT IN AN ORGANIZED HEALTH CARE EDUCATION/TRAINING PROGRAM

## 2023-07-19 PROCEDURE — 99999 PR PBB SHADOW E&M-EST. PATIENT-LVL V: ICD-10-PCS | Mod: PBBFAC,,, | Performed by: STUDENT IN AN ORGANIZED HEALTH CARE EDUCATION/TRAINING PROGRAM

## 2023-07-19 PROCEDURE — 3074F SYST BP LT 130 MM HG: CPT | Mod: CPTII,S$GLB,, | Performed by: STUDENT IN AN ORGANIZED HEALTH CARE EDUCATION/TRAINING PROGRAM

## 2023-07-19 PROCEDURE — 99396 PR PREVENTIVE VISIT,EST,40-64: ICD-10-PCS | Mod: S$GLB,,, | Performed by: STUDENT IN AN ORGANIZED HEALTH CARE EDUCATION/TRAINING PROGRAM

## 2023-07-19 PROCEDURE — 1159F PR MEDICATION LIST DOCUMENTED IN MEDICAL RECORD: ICD-10-PCS | Mod: CPTII,S$GLB,, | Performed by: STUDENT IN AN ORGANIZED HEALTH CARE EDUCATION/TRAINING PROGRAM

## 2023-07-19 PROCEDURE — 3078F PR MOST RECENT DIASTOLIC BLOOD PRESSURE < 80 MM HG: ICD-10-PCS | Mod: CPTII,S$GLB,, | Performed by: STUDENT IN AN ORGANIZED HEALTH CARE EDUCATION/TRAINING PROGRAM

## 2023-07-19 PROCEDURE — 3008F BODY MASS INDEX DOCD: CPT | Mod: CPTII,S$GLB,, | Performed by: STUDENT IN AN ORGANIZED HEALTH CARE EDUCATION/TRAINING PROGRAM

## 2023-07-19 PROCEDURE — 3074F PR MOST RECENT SYSTOLIC BLOOD PRESSURE < 130 MM HG: ICD-10-PCS | Mod: CPTII,S$GLB,, | Performed by: STUDENT IN AN ORGANIZED HEALTH CARE EDUCATION/TRAINING PROGRAM

## 2023-07-19 PROCEDURE — 99396 PREV VISIT EST AGE 40-64: CPT | Mod: S$GLB,,, | Performed by: STUDENT IN AN ORGANIZED HEALTH CARE EDUCATION/TRAINING PROGRAM

## 2023-07-19 RX ORDER — DULOXETIN HYDROCHLORIDE 20 MG/1
20 CAPSULE, DELAYED RELEASE ORAL DAILY
Qty: 30 CAPSULE | Refills: 0 | Status: SHIPPED | OUTPATIENT
Start: 2023-07-19 | End: 2023-08-01 | Stop reason: SDUPTHER

## 2023-07-19 NOTE — PROGRESS NOTES
Brianna Devine  1965        Subjective     Chief Complaint: Est Care    History of Present Illness:  Ms. Brianna Devine is a 58 y.o. female who presents to est care.    Fibromyalgia - Prev seen by Rheum 10/2021, Dr. Curiel. Was prescribed Cymbalta but did not try it. Had positive ANTONIO but other labs wnl.  Seeing Chiropractor, dx with IT band syndrome. +Chronic fatigue.   Daily NSAID use, 2-4x 200 mg a day. Was advised to limit amount. Wants to be able to go back to prior activity levels. Works stressful, not sure if contributing.    Osteoporosis- DEXA in 8/2022. Prescribed Fosamax but did not take it. On calcium and vitamin D. Does weight-bearing exercises though PT.    Family hx of breast cancer-Had mammogram 8/2022. OBGYN at [facility]. Due for repeat mammogram. 2 sisters with breast cancer and 1 grandmother. No ovarian cancer in family. Not interested in genetic testing.    Varicose veins- seeing outside Vascular surgeon. Dr. Burks. Doing well now. Had few foam procedures.    Hep C+, since 2015, unknown exposure. Denies needlestick, blood transfusion, tattoos, etc. Negative viral load 10/2021.      Due for colonoscopy. Father with colon cancer. Last done 3/2016. No blood in stools, bowels doing well. Q5 yrs.    Dog bite right hand, 4 months ago. Small terrier. Some tingling in that thumb/wrist. Wants to see hand specialist.     Review of Systems   Constitutional:  Positive for malaise/fatigue. Negative for chills and fever.   Respiratory:  Negative for cough.    Cardiovascular:  Negative for leg swelling.   Gastrointestinal:  Negative for abdominal pain, constipation, diarrhea, nausea and vomiting.   Musculoskeletal:  Positive for back pain, joint pain and myalgias. Negative for falls.   Neurological:  Positive for focal weakness (Right hand/thumb). Negative for tremors, sensory change and speech change.      PAST HISTORY:     Past Medical History:   Diagnosis Date    Chronic neck pain  10/29/2015    HCV antibody positive 10/29/2015    11/2015 viral load negative    History of shingles     2015    Overweight (BMI 25.0-29.9) 10/29/2015    Varicose veins of both lower extremities 10/29/2015    treated [facility] [place]       Past Surgical History:   Procedure Laterality Date    COLONOSCOPY N/A 3/29/2016    Procedure: COLONOSCOPY;  Surgeon: Maged Lea MD;  Location: [facility] ([address] [place] [address]);  Service: Endoscopy;  Laterality: N/A;    ENDOMETRIAL ABLATION      ENDOSCOPIC VEIN LASER TREATMENT Left 2017    Dr. Jake Burks/[facility] [place]       Family History   Problem Relation Age of Onset    Hypertension Mother     Heart failure Father     Colon cancer Father 75        colon cancer    Breast cancer Sister     Breast cancer Sister     Other Sister         [facility]       Social History     Socioeconomic History    Marital status:    Tobacco Use    Smoking status: Never    Smokeless tobacco: Never   Substance and Sexual Activity    Alcohol use: Yes     Comment: occasionally     Drug use: Never    Sexual activity: Yes   Social History Narrative    Gyn: Jonnie Serrato     at [employer]; works 50-60 hrs/week    ; 2 children: daughter lives in [place], son at [place]    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    glucosamine/chondr fallon A sod (OSTEO BI-FLEX ORAL) Take by mouth Daily.    MAGNESIUM ORAL Take by mouth Daily.    naproxen sodium (ANAPROX) 220 MG tablet Take 220 mg by mouth every 12 (twelve) hours. Prn    valACYclovir (VALTREX) 1000 MG tablet Take 1 tablet by mouth as needed.     vitamin E acetate (VITAMIN E ORAL) Take by mouth Daily.     No current facility-administered medications on file prior to visit.       Review of patient's allergies  "indicates:   Allergen Reactions    Sulfa (sulfonamide antibiotics) Hives       OBJECTIVE:     Vital Signs:  Vitals:    07/19/23 0841   BP: 120/72   BP Location: Left arm   Patient Position: Sitting   BP Method: Medium (Manual)   Pulse: 83   SpO2: 99%   Weight: 76.6 kg (168 lb 14 oz)   Height: 5' 6" (1.676 m)       Body mass index is 27.26 kg/m².     Physical Exam:  Physical Exam  Vitals and nursing note reviewed.   Constitutional:       General: She is not in acute distress.     Appearance: Normal appearance. She is not ill-appearing, toxic-appearing or diaphoretic.   HENT:      Head: Normocephalic and atraumatic.      Right Ear: Tympanic membrane, ear canal and external ear normal.      Left Ear: Tympanic membrane, ear canal and external ear normal.      Mouth/Throat:      Mouth: Mucous membranes are moist.      Pharynx: No oropharyngeal exudate.   Eyes:      General: No scleral icterus.        Right eye: No discharge.      Conjunctiva/sclera: Conjunctivae normal.   Cardiovascular:      Rate and Rhythm: Normal rate and regular rhythm.      Pulses: Normal pulses.      Heart sounds: No murmur heard.  Pulmonary:      Effort: Pulmonary effort is normal. No respiratory distress.      Breath sounds: Normal breath sounds. No wheezing.   Musculoskeletal:         General: No swelling, tenderness or signs of injury.      Cervical back: Normal range of motion and neck supple. No rigidity.      Right lower leg: No edema.      Left lower leg: No edema.   Lymphadenopathy:      Cervical: No cervical adenopathy.   Skin:     General: Skin is warm and dry.   Neurological:      Mental Status: She is alert and oriented to person, place, and time.   Psychiatric:         Mood and Affect: Mood and affect normal.         Behavior: Behavior normal.          Laboratory  Lab Results   Component Value Date    WBC 5.90 09/28/2021    HGB 13.2 09/28/2021    HCT 40.1 09/28/2021    MCV 88 09/28/2021     09/28/2021     Lab Results "   Component Value Date    GLU 83 07/03/2023     07/03/2023    K 4.6 07/03/2023     07/03/2023    CO2 25 07/03/2023    BUN 16 07/03/2023    CREATININE 0.7 07/03/2023    CALCIUM 9.2 07/03/2023     Lab Results   Component Value Date    INR 0.9 10/29/2015     Lab Results   Component Value Date    HGBA1C 4.9 09/28/2021           Health Maintenance         Date Due Completion Date    HIV Screening Never done ---    Shingles Vaccine (1 of 2) Never done ---    Colorectal Cancer Screening 03/29/2021 3/29/2016    COVID-19 Vaccine (2 - Booster for Lesly series) 04/29/2021 3/4/2021    Mammogram 08/15/2023 8/15/2022    Override on 6/9/2017: Done (completed at [facility])    Override on 2/15/2007: Done    Influenza Vaccine (1) 09/01/2023 ---    Cervical Cancer Screening 11/30/2023 11/30/2020    Lipid Panel 06/26/2024 6/26/2019    Hemoglobin A1c (Diabetic Prevention Screening) 09/28/2024 9/28/2021    TETANUS VACCINE 10/29/2025 10/29/2015 (Done)    Override on 10/29/2015: Done              ASSESSMENT & PLAN:   Ms. Brianna Devine is a 58 y.o. female who was seen today in clinic for est care, annual, chronic pain/fatigue. Will trial Cymbalta. Let me know how she feels on this. Limit daily NSAIDs, risks discussed. Mammogram through her OBGYN.      1. Establishing care with new doctor, encounter for    2. Annual physical exam  -     Hepatitis C Antibody; Future; Expected date: 07/19/2023  -     HEPATITIS C RNA, QUANTITATIVE, PCR; Future; Expected date: 07/19/2023  -     TSH; Future; Expected date: 07/19/2023  -     Lipid Panel; Future; Expected date: 01/19/2024  -     CBC Auto Differential; Future; Expected date: 07/19/2023  -     HEMOGLOBIN A1C; Future; Expected date: 07/19/2023    3. Fibromyalgia  -     DULoxetine (CYMBALTA) 20 MG capsule; Take 1 capsule (20 mg total) by mouth once daily.  Dispense: 30 capsule; Refill: 0    4. Fatigue, unspecified type  -     TSH; Future; Expected date: 07/19/2023  -     DULoxetine  (CYMBALTA) 20 MG capsule; Take 1 capsule (20 mg total) by mouth once daily.  Dispense: 30 capsule; Refill: 0    5. Injury of right hand, sequela  -     Ambulatory referral/consult to Orthopedics; Future; Expected date: 07/26/2023    6. NSAID long-term use    7. Osteoporosis, unspecified osteoporosis type, unspecified pathological fracture presence    8. Family history of breast cancer    9. HCV antibody positive  -     Hepatitis C Antibody; Future; Expected date: 07/19/2023  -     HEPATITIS C RNA, QUANTITATIVE, PCR; Future; Expected date: 07/19/2023    10. Encounter for colorectal cancer screening  -     Ambulatory referral/consult to Endo Procedure ; Future; Expected date: 07/20/2023    11. Family history of colon cancer  -     Ambulatory referral/consult to Endo Procedure ; Future; Expected date: 07/20/2023                 Lena Guevara MD  Internal Medicine         Portions of this note may have been generated using voice recognition software.  Please excuse any spelling/grammatical errors. Occasional wrong-word or sound-a-like substitutions may have also occurred due to the inherent limitations of voice recognition software. Please read the chart carefully and recognize, using context, where substitutions have occurred.

## 2023-07-28 ENCOUNTER — LAB VISIT (OUTPATIENT)
Dept: LAB | Facility: HOSPITAL | Age: 58
End: 2023-07-28
Attending: STUDENT IN AN ORGANIZED HEALTH CARE EDUCATION/TRAINING PROGRAM
Payer: COMMERCIAL

## 2023-07-28 DIAGNOSIS — Z00.00 ANNUAL PHYSICAL EXAM: ICD-10-CM

## 2023-07-28 DIAGNOSIS — R76.8 HCV ANTIBODY POSITIVE: ICD-10-CM

## 2023-07-28 DIAGNOSIS — R53.83 FATIGUE, UNSPECIFIED TYPE: ICD-10-CM

## 2023-07-28 LAB
BASOPHILS # BLD AUTO: 0.03 K/UL (ref 0–0.2)
BASOPHILS NFR BLD: 0.5 % (ref 0–1.9)
CHOLEST SERPL-MCNC: 147 MG/DL (ref 120–199)
CHOLEST/HDLC SERPL: 2.7 {RATIO} (ref 2–5)
DIFFERENTIAL METHOD: NORMAL
EOSINOPHIL # BLD AUTO: 0.1 K/UL (ref 0–0.5)
EOSINOPHIL NFR BLD: 1.5 % (ref 0–8)
ERYTHROCYTE [DISTWIDTH] IN BLOOD BY AUTOMATED COUNT: 13.6 % (ref 11.5–14.5)
ESTIMATED AVG GLUCOSE: 94 MG/DL (ref 68–131)
HBA1C MFR BLD: 4.9 % (ref 4–5.6)
HCT VFR BLD AUTO: 38.4 % (ref 37–48.5)
HCV AB SERPL QL IA: REACTIVE
HDLC SERPL-MCNC: 54 MG/DL (ref 40–75)
HDLC SERPL: 36.7 % (ref 20–50)
HGB BLD-MCNC: 12.6 G/DL (ref 12–16)
IMM GRANULOCYTES # BLD AUTO: 0.01 K/UL (ref 0–0.04)
IMM GRANULOCYTES NFR BLD AUTO: 0.2 % (ref 0–0.5)
LDLC SERPL CALC-MCNC: 80.2 MG/DL (ref 63–159)
LYMPHOCYTES # BLD AUTO: 2.1 K/UL (ref 1–4.8)
LYMPHOCYTES NFR BLD: 37.7 % (ref 18–48)
MCH RBC QN AUTO: 28.4 PG (ref 27–31)
MCHC RBC AUTO-ENTMCNC: 32.8 G/DL (ref 32–36)
MCV RBC AUTO: 87 FL (ref 82–98)
MONOCYTES # BLD AUTO: 0.5 K/UL (ref 0.3–1)
MONOCYTES NFR BLD: 9.1 % (ref 4–15)
NEUTROPHILS # BLD AUTO: 2.8 K/UL (ref 1.8–7.7)
NEUTROPHILS NFR BLD: 51 % (ref 38–73)
NONHDLC SERPL-MCNC: 93 MG/DL
NRBC BLD-RTO: 0 /100 WBC
PLATELET # BLD AUTO: 327 K/UL (ref 150–450)
PMV BLD AUTO: 9.5 FL (ref 9.2–12.9)
RBC # BLD AUTO: 4.43 M/UL (ref 4–5.4)
TRIGL SERPL-MCNC: 64 MG/DL (ref 30–150)
TSH SERPL DL<=0.005 MIU/L-ACNC: 2.37 UIU/ML (ref 0.4–4)
WBC # BLD AUTO: 5.51 K/UL (ref 3.9–12.7)

## 2023-07-28 PROCEDURE — 84443 ASSAY THYROID STIM HORMONE: CPT | Performed by: STUDENT IN AN ORGANIZED HEALTH CARE EDUCATION/TRAINING PROGRAM

## 2023-07-28 PROCEDURE — 87522 HEPATITIS C REVRS TRNSCRPJ: CPT | Performed by: STUDENT IN AN ORGANIZED HEALTH CARE EDUCATION/TRAINING PROGRAM

## 2023-07-28 PROCEDURE — 85025 COMPLETE CBC W/AUTO DIFF WBC: CPT | Performed by: STUDENT IN AN ORGANIZED HEALTH CARE EDUCATION/TRAINING PROGRAM

## 2023-07-28 PROCEDURE — 80061 LIPID PANEL: CPT | Performed by: STUDENT IN AN ORGANIZED HEALTH CARE EDUCATION/TRAINING PROGRAM

## 2023-07-28 PROCEDURE — 83036 HEMOGLOBIN GLYCOSYLATED A1C: CPT | Performed by: STUDENT IN AN ORGANIZED HEALTH CARE EDUCATION/TRAINING PROGRAM

## 2023-07-28 PROCEDURE — 36415 COLL VENOUS BLD VENIPUNCTURE: CPT | Mod: PN | Performed by: STUDENT IN AN ORGANIZED HEALTH CARE EDUCATION/TRAINING PROGRAM

## 2023-07-28 PROCEDURE — 86803 HEPATITIS C AB TEST: CPT | Performed by: STUDENT IN AN ORGANIZED HEALTH CARE EDUCATION/TRAINING PROGRAM

## 2023-07-31 ENCOUNTER — PATIENT MESSAGE (OUTPATIENT)
Dept: PRIMARY CARE CLINIC | Facility: CLINIC | Age: 58
End: 2023-07-31
Payer: COMMERCIAL

## 2023-07-31 LAB
HCV RNA SERPL QL NAA+PROBE: NOT DETECTED
HCV RNA SPEC NAA+PROBE-ACNC: NOT DETECTED IU/ML

## 2023-08-01 DIAGNOSIS — R53.83 FATIGUE, UNSPECIFIED TYPE: ICD-10-CM

## 2023-08-01 DIAGNOSIS — M79.7 FIBROMYALGIA: ICD-10-CM

## 2023-08-01 RX ORDER — DULOXETIN HYDROCHLORIDE 20 MG/1
20 CAPSULE, DELAYED RELEASE ORAL 2 TIMES DAILY
Qty: 60 CAPSULE | Refills: 0 | Status: SHIPPED | OUTPATIENT
Start: 2023-08-01 | End: 2023-10-08 | Stop reason: SDUPTHER

## 2023-08-18 ENCOUNTER — PATIENT MESSAGE (OUTPATIENT)
Dept: ORTHOPEDICS | Facility: CLINIC | Age: 58
End: 2023-08-18
Payer: COMMERCIAL

## 2023-08-18 DIAGNOSIS — M79.641 RIGHT HAND PAIN: Primary | ICD-10-CM

## 2023-08-21 ENCOUNTER — HOSPITAL ENCOUNTER (OUTPATIENT)
Dept: RADIOLOGY | Facility: OTHER | Age: 58
Discharge: HOME | End: 2023-08-21
Attending: ORTHOPAEDIC SURGERY
Payer: COMMERCIAL

## 2023-08-21 ENCOUNTER — OFFICE VISIT (OUTPATIENT)
Dept: ORTHOPEDICS | Facility: CLINIC | Age: 58
End: 2023-08-21
Payer: COMMERCIAL

## 2023-08-21 VITALS — WEIGHT: 168 LBS | BODY MASS INDEX: 27 KG/M2 | HEIGHT: 66 IN

## 2023-08-21 DIAGNOSIS — S69.91XS INJURY OF RIGHT HAND, SEQUELA: ICD-10-CM

## 2023-08-21 DIAGNOSIS — M79.641 RIGHT HAND PAIN: ICD-10-CM

## 2023-08-21 PROCEDURE — 3008F PR BODY MASS INDEX (BMI) DOCUMENTED: ICD-10-PCS | Mod: CPTII,S$GLB,, | Performed by: ORTHOPAEDIC SURGERY

## 2023-08-21 PROCEDURE — 1159F PR MEDICATION LIST DOCUMENTED IN MEDICAL RECORD: ICD-10-PCS | Mod: CPTII,S$GLB,, | Performed by: ORTHOPAEDIC SURGERY

## 2023-08-21 PROCEDURE — 3044F PR MOST RECENT HEMOGLOBIN A1C LEVEL <7.0%: ICD-10-PCS | Mod: CPTII,S$GLB,, | Performed by: ORTHOPAEDIC SURGERY

## 2023-08-21 PROCEDURE — 99999 PR PBB SHADOW E&M-EST. PATIENT-LVL III: CPT | Mod: PBBFAC,,, | Performed by: ORTHOPAEDIC SURGERY

## 2023-08-21 PROCEDURE — 3044F HG A1C LEVEL LT 7.0%: CPT | Mod: CPTII,S$GLB,, | Performed by: ORTHOPAEDIC SURGERY

## 2023-08-21 PROCEDURE — 99204 OFFICE O/P NEW MOD 45 MIN: CPT | Mod: S$GLB,,, | Performed by: ORTHOPAEDIC SURGERY

## 2023-08-21 PROCEDURE — 99999 PR PBB SHADOW E&M-EST. PATIENT-LVL III: ICD-10-PCS | Mod: PBBFAC,,, | Performed by: ORTHOPAEDIC SURGERY

## 2023-08-21 PROCEDURE — 73130 X-RAY EXAM OF HAND: CPT | Mod: TC,FY,RT

## 2023-08-21 PROCEDURE — 99204 PR OFFICE/OUTPT VISIT, NEW, LEVL IV, 45-59 MIN: ICD-10-PCS | Mod: S$GLB,,, | Performed by: ORTHOPAEDIC SURGERY

## 2023-08-21 PROCEDURE — 73130 XR HAND COMPLETE 3 VIEW RIGHT: ICD-10-PCS | Mod: 26,RT,, | Performed by: RADIOLOGY

## 2023-08-21 PROCEDURE — 1159F MED LIST DOCD IN RCRD: CPT | Mod: CPTII,S$GLB,, | Performed by: ORTHOPAEDIC SURGERY

## 2023-08-21 PROCEDURE — 73130 X-RAY EXAM OF HAND: CPT | Mod: 26,RT,, | Performed by: RADIOLOGY

## 2023-08-21 PROCEDURE — 3008F BODY MASS INDEX DOCD: CPT | Mod: CPTII,S$GLB,, | Performed by: ORTHOPAEDIC SURGERY

## 2023-08-21 NOTE — PROGRESS NOTES
[facility]  History & Physical  Orthopedics    SUBJECTIVE:      COVID-19 attestation:  This patient was treated during the COVID-19 pandemic.  This was discussed with the patient, they are aware of our current policies and procedures, were given the option of delaying their visit and or switching to a virtual visit, delaying their surgery when applicable, and they elect to proceed.    Chief Complaint:  Right thumb basal joint pain    Referring Provider: Lena Guevara MD     History of Present Illness:  Patient is a 58 y.o. left hand dominant female who presents today with complaints of right thumb basal joint pain.  The patient notes that about 4 weeks ago she sustained a small dog bite to the volar right wrist.  She subsequently developed some purplish discoloration at the site and some right thumb CMC pain.  This has largely resolved at this point in time.  She denies any numbness or tingling or range of motion deficits.  She has no other complaints today other than some right thumb CMC pain and presents today for initial evaluation.     The patient is a/an  at the [employer].    Onset of symptoms/DOI was 4 weeks ago.    Symptoms are aggravated by activity and movement.    Symptoms are alleviated by rest.    Symptoms consist of pain.    The patient rates their pain as a 2/10.    Attempted treatment(s) and/or interventions include activity modifications, rest     The patient denies any fevers, chills, N/V, D/C and presents for evaluation.       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 Jake Burks/[facility] Vein [place]     Review of patient's allergies indicates:   Allergen Reactions    Sulfa (sulfonamide antibiotics) Hives     Social History     Social History Narrative    Gyn: Jonnie Serrato     at 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, sitters)    Regular exercise: jazzercise     Family History   Problem Relation Age of Onset    Hypertension Mother     Heart failure Father     Colon cancer Father 75        colon cancer    Breast cancer Sister     Breast cancer Sister     Other Sister         MGUS         Current Outpatient Medications:     CALCIUM CITRATE-VITAMIN D3 ORAL, Take by mouth Daily., Disp: , Rfl:     DULoxetine (CYMBALTA) 20 MG capsule, Take 1 capsule (20 mg total) by mouth 2 (two) times daily., Disp: 60 capsule, Rfl: 0    glucosamine/chondr fallon A sod (OSTEO BI-FLEX ORAL), Take by mouth Daily., Disp: , Rfl:     MAGNESIUM ORAL, Take by mouth Daily., Disp: , Rfl:     naproxen sodium (ANAPROX) 220 MG tablet, Take 220 mg by mouth every 12 (twelve) hours. Prn, Disp: , Rfl:     valACYclovir (VALTREX) 1000 MG tablet, Take 1 tablet by mouth as needed. , Disp: , Rfl:     vitamin E acetate (VITAMIN E ORAL), Take by mouth Daily., Disp: , Rfl:       Review of Systems:  As per HPI otherwise noncontributory    OBJECTIVE:      Vital Signs (Most Recent):  Vitals:    08/21/23 1448   Weight: 76.2 kg (168 lb)   Height: 5' 6" (1.676 m)     Body mass index is 27.12 kg/m².      Physical Exam:  Constitutional: The patient appears well-developed and well-nourished. No distress.   Skin: No lesions appreciated  Head: Normocephalic and atraumatic.   Nose: Nose normal.   Ears: No deformities seen  Eyes: Conjunctivae and EOM are normal.   Neck: No tracheal " deviation present.   Cardiovascular: Normal rate and intact distal pulses.    Pulmonary/Chest: Effort normal. No respiratory distress.   Abdominal: There is no guarding.   Neurological: The patient is alert.   Psychiatric: The patient has a normal mood and affect.     Right Hand/Wrist Examination:    Observation/Inspection:  Swelling  none    Deformity  none  Discoloration  no discoloration today    Scars   no discernible scars    Atrophy  None  All flexor and extensor tendons are intact by exam    HAND/WRIST EXAMINATION:  Finkelstein's Test   Neg  WHAT Test    Neg  Snuff box tenderness   Neg  Thompson's Test    Neg  Hook of Hamate Tenderness  Neg  CMC grind    positive  Circumduction test   positive    Neurovascular Exam:  Digits WWP, brisk CR < 3s throughout  NVI motor/LTS to M/R/U nerves, radial pulse 2+  Tinel's Test - Carpal Tunnel  Neg  Tinel's Test - Cubital Tunnel  Neg  Phalen's Test    Neg  Median Nerve Compression Test Neg    ROM hand full, painless    ROM wrist full, painless    ROM elbow full, painless    Abdomen not guarded  Respirations nonlabored  Perfusion intact    Diagnostic Results:     Imaging - I independently viewed the patient's imaging as well as the radiology report.  Xrays of the patient's right hand  demonstrate no evidence of any acute fractures or dislocations with some significant right thumb CMC degenerative changes.    EMG - none    ASSESSMENT/PLAN:      58 y.o. yo female with right CMC arthritis  Plan: The patient and I had a thorough discussion today.  We discussed the working diagnosis as well as several other potential alternative diagnoses.  Treatment options were discussed, both conservative and surgical.  Conservative treatment options would include things such as activity modifications, workplace modifications, a period of rest, oral vs topical OTC and prescription anti-inflammatory medications, occupational therapy, splinting/bracing, immobilization, corticosteroid injections,  and others.  Surgical options were discussed as well.     At this time, the patient may have suffered some ecchymosis or hematoma from her small dog bite which has now cleared up.  She is left with some residual CMC arthritis.  She certainly has no neurovascular deficit or musculotendinous injuries on exam today.  She seems to be suffering from CMC arthritis and for this I would like to prescribe anti-inflammatory medications over-the-counter as needed as well as a right thumb spica brace.  OT referral as well.  Follow up on an as needed/if needed basis.        Should the patient's symptoms worsen, persist, or fail to improve they should return for reevaluation and I would be happy to see them back anytime.        Louis Hartley M.D.    Please be aware that this note has been generated with the assistance of [employer] voice-to-text.  Please excuse any spelling or grammatical errors.    Thank you for choosing Dr. Louis Hartley for your orthopedic hand and upper extremity care. It is our goal to provide you with exceptional care that will help keep you healthy, active, and get you back in the game.     If you felt that you received exemplary care today, please consider leaving feedback for Dr. Hartley on [employer] at [email or web address].    Please do not hesitate to reach out to us via email, phone, or MyChart with any questions, concerns, or feedback.

## 2023-08-24 ENCOUNTER — PATIENT MESSAGE (OUTPATIENT)
Dept: PRIMARY CARE CLINIC | Facility: CLINIC | Age: 58
End: 2023-08-24
Payer: COMMERCIAL

## 2023-08-24 ENCOUNTER — CLINICAL SUPPORT (OUTPATIENT)
Dept: ENDOSCOPY | Facility: HOSPITAL | Age: 58
End: 2023-08-24
Payer: COMMERCIAL

## 2023-08-24 ENCOUNTER — TELEPHONE (OUTPATIENT)
Dept: ENDOSCOPY | Facility: HOSPITAL | Age: 58
End: 2023-08-24

## 2023-08-24 DIAGNOSIS — Z12.11 ENCOUNTER FOR COLORECTAL CANCER SCREENING: ICD-10-CM

## 2023-08-24 DIAGNOSIS — Z12.12 ENCOUNTER FOR COLORECTAL CANCER SCREENING: ICD-10-CM

## 2023-08-24 DIAGNOSIS — Z80.0 FAMILY HISTORY OF COLON CANCER: ICD-10-CM

## 2023-08-24 RX ORDER — SODIUM, POTASSIUM,MAG SULFATES 17.5-3.13G
1 SOLUTION, RECONSTITUTED, ORAL ORAL DAILY
Qty: 1 KIT | Refills: 0 | Status: SHIPPED | OUTPATIENT
Start: 2023-08-24 | End: 2023-08-26

## 2023-08-24 NOTE — TELEPHONE ENCOUNTER
Spoke to pt to schedule procedure(s) Colonoscopy       Physician to perform procedure(s) Dr. RUBIO Duke  Date of Procedure (s) 11/28/23  Arrival Time 8:30 AM  Time of Procedure(s) 9:30 AM   Location of Procedure(s) [place] 4th Floor  Type of Rx Prep sent to patient: Suprep  Instructions provided to patient via [facility]    Patient was informed on the following information and verbalized understanding. Screening questionnaire reviewed with patient and complete. If procedure requires anesthesia, a responsible adult needs to be present to accompany the patient home, patient cannot drive after receiving anesthesia. Appointment details are tentative, especially check-in time. Patient will receive a prep-op call 4 days prior to confirm check-in time for procedure. If applicable the patient should contact their pharmacy to verify Rx for procedure prep is ready for pick-up. Patient was advised to call the scheduling department at [phone] if pharmacy states no Rx is available. Patient was advised to call the endoscopy scheduling department if any questions or concerns arise.      [facility] Scheduling Department

## 2023-10-08 DIAGNOSIS — R53.83 FATIGUE, UNSPECIFIED TYPE: ICD-10-CM

## 2023-10-08 DIAGNOSIS — M79.7 FIBROMYALGIA: ICD-10-CM

## 2023-10-09 NOTE — TELEPHONE ENCOUNTER
No care due was identified.  [facility] Embedded Care Due Messages. Reference number: [id number].   10/08/2023 8:52:45 PM CDT

## 2023-10-10 RX ORDER — DULOXETIN HYDROCHLORIDE 20 MG/1
20 CAPSULE, DELAYED RELEASE ORAL 2 TIMES DAILY
Qty: 60 CAPSULE | Refills: 0 | Status: SHIPPED | OUTPATIENT
Start: 2023-10-10 | End: 2023-11-20

## 2023-11-16 LAB — PAP RECOMMENDATION EXT: NORMAL

## 2023-11-20 DIAGNOSIS — R53.83 FATIGUE, UNSPECIFIED TYPE: ICD-10-CM

## 2023-11-20 DIAGNOSIS — M79.7 FIBROMYALGIA: ICD-10-CM

## 2023-11-20 RX ORDER — DULOXETIN HYDROCHLORIDE 20 MG/1
20 CAPSULE, DELAYED RELEASE ORAL 2 TIMES DAILY
Qty: 180 CAPSULE | Refills: 1 | Status: SHIPPED | OUTPATIENT
Start: 2023-11-20

## 2023-11-20 NOTE — TELEPHONE ENCOUNTER
Refill Decision Note   Brianna Devine  is requesting a refill authorization.  Brief Assessment and Rationale for Refill:  Approve     Medication Therapy Plan:         Comments:     Note composed:9:57 AM 11/20/2023

## 2023-11-20 NOTE — TELEPHONE ENCOUNTER
No care due was identified.  [facility] Embedded Care Due Messages. Reference number: [id number].   11/20/2023 3:23:16 AM CST

## 2023-11-28 ENCOUNTER — ANESTHESIA (OUTPATIENT)
Dept: ENDOSCOPY | Facility: HOSPITAL | Age: 58
End: 2023-11-28
Payer: COMMERCIAL

## 2023-11-28 ENCOUNTER — HOSPITAL ENCOUNTER (OUTPATIENT)
Facility: HOSPITAL | Age: 58
Discharge: HOME | End: 2023-11-28
Attending: INTERNAL MEDICINE | Admitting: INTERNAL MEDICINE
Payer: COMMERCIAL

## 2023-11-28 ENCOUNTER — ANESTHESIA EVENT (OUTPATIENT)
Dept: ENDOSCOPY | Facility: HOSPITAL | Age: 58
End: 2023-11-28
Payer: COMMERCIAL

## 2023-11-28 VITALS
RESPIRATION RATE: 16 BRPM | WEIGHT: 168 LBS | HEIGHT: 67 IN | HEART RATE: 73 BPM | SYSTOLIC BLOOD PRESSURE: 120 MMHG | DIASTOLIC BLOOD PRESSURE: 60 MMHG | BODY MASS INDEX: 26.37 KG/M2 | OXYGEN SATURATION: 100 % | TEMPERATURE: 98 F

## 2023-11-28 DIAGNOSIS — Z80.0 FAMILY HISTORY OF COLON CANCER: ICD-10-CM

## 2023-11-28 PROCEDURE — 25000003 PHARM REV CODE 250: Performed by: INTERNAL MEDICINE

## 2023-11-28 PROCEDURE — 63600175 PHARM REV CODE 636 W HCPCS: Performed by: NURSE ANESTHETIST, CERTIFIED REGISTERED

## 2023-11-28 PROCEDURE — E9220 PRA ENDO ANESTHESIA: HCPCS | Mod: 33,,, | Performed by: NURSE ANESTHETIST, CERTIFIED REGISTERED

## 2023-11-28 PROCEDURE — 88305 TISSUE EXAM BY PATHOLOGIST: ICD-10-PCS | Mod: 26,,, | Performed by: STUDENT IN AN ORGANIZED HEALTH CARE EDUCATION/TRAINING PROGRAM

## 2023-11-28 PROCEDURE — 25000003 PHARM REV CODE 250: Performed by: NURSE ANESTHETIST, CERTIFIED REGISTERED

## 2023-11-28 PROCEDURE — 88305 TISSUE EXAM BY PATHOLOGIST: CPT | Performed by: STUDENT IN AN ORGANIZED HEALTH CARE EDUCATION/TRAINING PROGRAM

## 2023-11-28 PROCEDURE — 37000009 HC ANESTHESIA EA ADD 15 MINS: Performed by: INTERNAL MEDICINE

## 2023-11-28 PROCEDURE — 37000008 HC ANESTHESIA 1ST 15 MINUTES: Performed by: INTERNAL MEDICINE

## 2023-11-28 PROCEDURE — 45385 COLONOSCOPY W/LESION REMOVAL: CPT | Mod: 33,,, | Performed by: INTERNAL MEDICINE

## 2023-11-28 PROCEDURE — 27201089 HC SNARE, DISP (ANY): Performed by: INTERNAL MEDICINE

## 2023-11-28 PROCEDURE — 45385 PR COLONOSCOPY,REMV LESN,SNARE: ICD-10-PCS | Mod: 33,,, | Performed by: INTERNAL MEDICINE

## 2023-11-28 PROCEDURE — E9220 PRA ENDO ANESTHESIA: ICD-10-PCS | Mod: 33,,, | Performed by: NURSE ANESTHETIST, CERTIFIED REGISTERED

## 2023-11-28 PROCEDURE — 45385 COLONOSCOPY W/LESION REMOVAL: CPT | Mod: PT | Performed by: INTERNAL MEDICINE

## 2023-11-28 PROCEDURE — 88305 TISSUE EXAM BY PATHOLOGIST: CPT | Mod: 26,,, | Performed by: STUDENT IN AN ORGANIZED HEALTH CARE EDUCATION/TRAINING PROGRAM

## 2023-11-28 RX ORDER — PROPOFOL 10 MG/ML
VIAL (ML) INTRAVENOUS CONTINUOUS PRN
Status: DISCONTINUED | OUTPATIENT
Start: 2023-11-28 | End: 2023-11-28

## 2023-11-28 RX ORDER — SODIUM CHLORIDE 9 MG/ML
INJECTION, SOLUTION INTRAVENOUS CONTINUOUS
Status: DISCONTINUED | OUTPATIENT
Start: 2023-11-28 | End: 2023-11-28 | Stop reason: HOSPADM

## 2023-11-28 RX ORDER — LIDOCAINE HYDROCHLORIDE 20 MG/ML
INJECTION INTRAVENOUS
Status: DISCONTINUED | OUTPATIENT
Start: 2023-11-28 | End: 2023-11-28

## 2023-11-28 RX ADMIN — SODIUM CHLORIDE: 0.9 INJECTION, SOLUTION INTRAVENOUS at 09:11

## 2023-11-28 RX ADMIN — PROPOFOL 50 MG: 10 INJECTION, EMULSION INTRAVENOUS at 09:11

## 2023-11-28 RX ADMIN — LIDOCAINE HYDROCHLORIDE 40 MG: 20 INJECTION INTRAVENOUS at 09:11

## 2023-11-28 RX ADMIN — PROPOFOL 30 MG: 10 INJECTION, EMULSION INTRAVENOUS at 09:11

## 2023-11-28 RX ADMIN — PROPOFOL 150 MCG/KG/MIN: 10 INJECTION, EMULSION INTRAVENOUS at 09:11

## 2023-11-28 NOTE — ANESTHESIA PREPROCEDURE EVALUATION
11/28/2023  Brianna Devine is a 58 y.o., female.      Pre-op Assessment    I have reviewed the Patient Summary Reports.     I have reviewed the Nursing Notes. I have reviewed the NPO Status.   I have reviewed the Medications.     Review of Systems  Anesthesia Hx:  No problems with previous Anesthesia   History of prior surgery of interest to airway management or planning:          Denies Family Hx of Anesthesia complications.    Denies Personal Hx of Anesthesia complications.                    Hematology/Oncology:  Hematology Normal   Oncology Normal                                   EENT/Dental:  EENT/Dental Normal           Cardiovascular:  Cardiovascular Normal Exercise tolerance: good                                           Pulmonary:  Pulmonary Normal                       Renal/:  Renal/ Normal                 Hepatic/GI:  Hepatic/GI Normal                 Musculoskeletal:  Musculoskeletal Normal                Neurological:        Chronic Pain Syndrome                         Endocrine:  Endocrine Normal            Dermatological:  Skin Normal    Psych:  Psychiatric Normal                    Physical Exam  General: Well nourished, Cooperative, Alert and Oriented    Airway:  Mallampati: II   Mouth Opening: Normal  TM Distance: Normal  Tongue: Normal  Neck ROM: Normal ROM    Dental:  Intact        Anesthesia Plan  Type of Anesthesia, risks & benefits discussed:    Anesthesia Type: Gen Natural Airway  Intra-op Monitoring Plan: Standard ASA Monitors  Induction:  IV  Informed Consent: Informed consent signed with the Patient and all parties understand the risks and agree with anesthesia plan.  All questions answered.   ASA Score: 1  Day of Surgery Review of History & Physical: H&P Update referred to the surgeon/provider.    Ready For Surgery From Anesthesia Perspective.     .

## 2023-11-28 NOTE — ANESTHESIA POSTPROCEDURE EVALUATION
Anesthesia Post Evaluation    Patient: Brianna Devine    Procedure(s) Performed: Procedure(s) (LRB):  COLONOSCOPY (N/A)    Final Anesthesia Type: general      Patient location during evaluation: PACU  Patient participation: Yes- Able to Participate  Level of consciousness: awake and alert and oriented  Post-procedure vital signs: reviewed and stable  Pain management: adequate  Airway patency: patent    PONV status at discharge: No PONV  Anesthetic complications: no      Cardiovascular status: blood pressure returned to baseline  Respiratory status: unassisted, room air and spontaneous ventilation  Hydration status: euvolemic  Follow-up not needed.          Vitals Value Taken Time   /60 11/28/23 1040   Temp 36.6 °C (97.9 °F) 11/28/23 1010   Pulse 73 11/28/23 1040   Resp 16 11/28/23 1040   SpO2 100 % 11/28/23 1040         Event Time   Out of Recovery 10:47:12         Pain/Titus Score: Titus Score: 10 (11/28/2023 10:21 AM)

## 2023-11-28 NOTE — TRANSFER OF CARE
"Anesthesia Transfer of Care Note    Patient: Brianna Devine    Procedure(s) Performed: Procedure(s) (LRB):  COLONOSCOPY (N/A)    Patient location: GI    Anesthesia Type: general    Transport from OR: Transported from OR on room air with adequate spontaneous ventilation    Post pain: adequate analgesia    Post assessment: no apparent anesthetic complications    Post vital signs: stable    Level of consciousness: awake    Nausea/Vomiting: no nausea/vomiting    Complications: none    Transfer of care protocol was followed      Last vitals: Visit Vitals  /75   Pulse 84   Temp 36.6 °C (97.9 °F)   Resp 15   Ht 5' 6.5" (1.689 m)   Wt 76.2 kg (168 lb)   SpO2 96%   Breastfeeding No   BMI 26.71 kg/m²     "

## 2023-11-28 NOTE — H&P
Short Stay Endoscopy History and Physical    PCP - Lena Guevara MD    Procedure - Colonoscopy  ASA - 2  Mallampati - per anesthesia  History of Anesthesia problems - no  Family history Anesthesia problems -  no     HPI:  This is a 58 y.o. female here for evaluation of :     Average Risk Screening: yes  High risk screening: No  History of polyps: No  Anemia: No  Blood in stools: No  Diarrhea: No  Abdominal Pain: No    Review of Systems:  CONSTITUTIONAL: Denies weight change,  fatigue, fevers, chills, night sweats.  CARDIOVASCULAR: Denies chest pain, shortness of breath, orthopnea and edema.  RESPIRATORY: Denies cough, hemoptysis, dyspnea, and wheezing.  GI: See HPI.    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 Jake Burks/[facility] Vein [place]       Family History:   Family History   Problem Relation Age of Onset    Hypertension Mother     Heart failure Father     Colon cancer Father 75        colon cancer    Breast cancer Sister     Breast cancer Sister     Other Sister         MGUS       Social History:   Social History     Tobacco Use    Smoking status: Never    Smokeless tobacco: Never   Substance Use Topics    Alcohol use: Yes     Comment: occasionally     Drug use: Never       Allergies: Reviewed.    Medications:  No current facility-administered medications on file prior to encounter.     Current Outpatient Medications on File  Prior to Encounter   Medication Sig Dispense Refill    CALCIUM CITRATE-VITAMIN D3 ORAL Take by mouth Daily.      glucosamine/chondr fallon A sod (OSTEO BI-FLEX ORAL) Take by mouth Daily.      MAGNESIUM ORAL Take by mouth Daily.      naproxen sodium (ANAPROX) 220 MG tablet Take 220 mg by mouth every 12 (twelve) hours. Prn      valACYclovir (VALTREX) 1000 MG tablet Take 1 tablet by mouth as needed.       vitamin E acetate (VITAMIN E ORAL) Take by mouth Daily.         Physical Exam:  Vital Signs:   Vitals:    11/28/23 0917   BP: 115/75   Pulse: 84   Resp: 15   Temp: 97.9 °F (36.6 °C)     General Appearance: Well appearing in no acute distress  ENT: OP clear  Chest: CTA B  CV: RRR, no m/r/g  Abd: s/nt/nd/nabs  Ext: no edema    Labs:  Reviewed    IMPRESSION:    Screening    Plan:  I have explained the risks and benefits of colonoscopy to the patient including but not limited to bleeding, perforation, infection, and death. The patient wishes to proceed with colonoscopy.

## 2023-11-28 NOTE — PROVATION PATIENT INSTRUCTIONS
Discharge Summary/Instructions after an Endoscopic Procedure  Patient Name: Brianna Devine  Patient MRN: [id number]  Patient YOB: 1965 Tuesday, November 28, 2023  Moses Duke MD  Dear patient,  As a result of recent federal legislation (The Federal Cures Act), you may   receive lab or pathology results from your procedure in your [facility]   account before your physician is able to contact you. Your physician or   their representative will relay the results to you with their   recommendations at their soonest availability.  Thank you,  RESTRICTIONS:  During your procedure today, you received medications for sedation.  These   medications may affect your judgment, balance and coordination.  Therefore,   for 24 hours, you have the following restrictions:   - DO NOT drive a car, operate machinery, make legal/financial decisions,   sign important papers or drink alcohol.    ACTIVITY:  Today: no heavy lifting, straining or running due to procedural   sedation/anesthesia.  The following day: return to full activity including work.  DIET:  Eat and drink normally unless instructed otherwise.     TREATMENT FOR COMMON SIDE EFFECTS:  - Mild abdominal pain, nausea, belching, bloating or excessive gas:  rest,   eat lightly and use a heating pad.  - Sore Throat: treat with throat lozenges and/or gargle with warm salt   water.  - Because air was used during the procedure, expelling large amounts of air   from your rectum or belching is normal.  - If a bowel prep was taken, you may not have a bowel movement for 1-3 days.    This is normal.  SYMPTOMS TO WATCH FOR AND REPORT TO YOUR PHYSICIAN:  1. Abdominal pain or bloating, other than gas cramps.  2. Chest pain.  3. Back pain.  4. Signs of infection such as: chills or fever occurring within 24 hours   after the procedure.  5. Rectal bleeding, which would show as bright red, maroon, or black stools.   (A tablespoon of blood from the rectum is not serious,  especially if   hemorrhoids are present.)  6. Vomiting.  7. Weakness or dizziness.  GO DIRECTLY TO THE NEAREST EMERGENCY ROOM IF YOU HAVE ANY OF THE FOLLOWING:      Difficulty breathing              Chills and/or fever over 101 F   Persistent vomiting and/or vomiting blood   Severe abdominal pain   Severe chest pain   Black, tarry stools   Bleeding- more than one tablespoon   Any other symptom or condition that you feel may need urgent attention  Your doctor recommends these additional instructions:  If any biopsies were taken, your doctors clinic will contact you in 1 to 2   weeks with any results.  - Discharge patient to home.   - High fiber diet indefinitely.   - Continue present medications.   - Use fiber, for example Citrucel, Fibercon, Konsyl or Metamucil.   - Await pathology results.   - Repeat colonoscopy in 7 years for surveillance.   - Return to referring physician as previously scheduled.   - Patient has a contact number available for emergencies.  The signs and   symptoms of potential delayed complications were discussed with the   patient.  Return to normal activities tomorrow.  Written discharge   instructions were provided to the patient.  For questions, problems or results please call your physician - Moses Duke MD at Work:  [phone].  [facility], EMERGENCY ROOM PHONE NUMBER: [phone]  IF A COMPLICATION OR EMERGENCY SITUATION ARISES AND YOU ARE UNABLE TO REACH   YOUR PHYSICIAN - GO DIRECTLY TO THE EMERGENCY ROOM.  Moses Duke MD  11/28/2023 10:07:54 AM  This report has been verified and signed electronically.  Dear patient,  As a result of recent federal legislation (The Federal Cures Act), you may   receive lab or pathology results from your procedure in your [facility]   account before your physician is able to contact you. Your physician or   their representative will relay the results to you with their   recommendations at their soonest availability.  Thank  you,  PROVATION

## 2023-12-03 LAB
FINAL PATHOLOGIC DIAGNOSIS: NORMAL
GROSS: NORMAL
Lab: NORMAL
MICROSCOPIC EXAM: NORMAL

## 2024-02-22 ENCOUNTER — PATIENT MESSAGE (OUTPATIENT)
Dept: ADMINISTRATIVE | Facility: HOSPITAL | Age: 59
End: 2024-02-22
Payer: COMMERCIAL

## 2024-02-26 ENCOUNTER — PATIENT OUTREACH (OUTPATIENT)
Dept: ADMINISTRATIVE | Facility: HOSPITAL | Age: 59
End: 2024-02-26
Payer: COMMERCIAL

## 2024-02-26 NOTE — LETTER
AUTHORIZATION FOR RELEASE OF   CONFIDENTIAL INFORMATION    Dear Dr. Matos,    We are seeing Brianna Devine, date of birth 1965, in the clinic at [facility]. Lena Guevara MD is the patient's PCP. Brianna Devine has an outstanding lab/procedure at the time we reviewed her chart. In order to help keep her health information updated, she has authorized us to request the following medical record(s):        (  )  MAMMOGRAM                                      (  )  COLONOSCOPY      ( X )  PAP SMEAR                                          (  )  OUTSIDE LAB RESULTS     (  )  DEXA SCAN                                          (  )  EYE EXAM            (  )  FOOT EXAM                                          (  )  ENTIRE RECORD     (  )  OUTSIDE IMMUNIZATIONS                 (  )  _______________         Please fax records to [facility], Amjed, Saira, MD, [phone]     If you have any questions, please contact Jayne at [phone].           Patient Name: Brianna Devine  : 1965  Patient Phone #: [phone]

## 2024-02-26 NOTE — PROGRESS NOTES

## 2024-03-13 ENCOUNTER — PATIENT OUTREACH (OUTPATIENT)
Dept: ADMINISTRATIVE | Facility: HOSPITAL | Age: 59
End: 2024-03-13
Payer: COMMERCIAL

## 2024-03-13 NOTE — PROGRESS NOTES
Patient due for the following    HIV Screening     Shingles Vaccine (1 of 2)    RSV Vaccine (Age 60+ and Pregnant patients) (1 - 1-dose 60+ series)    Influenza Vaccine (1)    COVID-19 Vaccine (2 - 2023-24 season)      Received pap records.  Scanned to media.  updated    Immunizations: reviewed and updated  Care Everywhere: triggered  Care Teams: up to date  Outreach: none needed

## 2024-05-31 ENCOUNTER — OFFICE VISIT (OUTPATIENT)
Dept: PRIMARY CARE CLINIC | Facility: CLINIC | Age: 59
End: 2024-05-31
Payer: COMMERCIAL

## 2024-05-31 VITALS
WEIGHT: 179.25 LBS | DIASTOLIC BLOOD PRESSURE: 80 MMHG | BODY MASS INDEX: 28.13 KG/M2 | SYSTOLIC BLOOD PRESSURE: 116 MMHG | HEART RATE: 67 BPM | OXYGEN SATURATION: 98 % | HEIGHT: 67 IN

## 2024-05-31 DIAGNOSIS — M79.7 FIBROMYALGIA: ICD-10-CM

## 2024-05-31 DIAGNOSIS — Z12.31 ENCOUNTER FOR SCREENING MAMMOGRAM FOR MALIGNANT NEOPLASM OF BREAST: ICD-10-CM

## 2024-05-31 DIAGNOSIS — T75.3XXD MOTION SICKNESS, SUBSEQUENT ENCOUNTER: Primary | ICD-10-CM

## 2024-05-31 DIAGNOSIS — I83.93 VARICOSE VEINS OF BOTH LOWER EXTREMITIES, UNSPECIFIED WHETHER COMPLICATED: ICD-10-CM

## 2024-05-31 DIAGNOSIS — Z91.89 AT HIGH RISK FOR BREAST CANCER: ICD-10-CM

## 2024-05-31 DIAGNOSIS — Z01.419 WELL WOMAN EXAM: ICD-10-CM

## 2024-05-31 DIAGNOSIS — Z80.3 FAMILY HISTORY OF BREAST CANCER IN SISTER: ICD-10-CM

## 2024-05-31 DIAGNOSIS — Z00.00 ROUTINE HEALTH MAINTENANCE: ICD-10-CM

## 2024-05-31 DIAGNOSIS — Z80.0 FAMILY HISTORY OF COLON CANCER: ICD-10-CM

## 2024-05-31 PROBLEM — N60.12 DIFFUSE CYSTIC MASTOPATHY OF BOTH BREASTS: Status: ACTIVE | Noted: 2020-11-30

## 2024-05-31 PROBLEM — N60.11 DIFFUSE CYSTIC MASTOPATHY OF BOTH BREASTS: Status: ACTIVE | Noted: 2020-11-30

## 2024-05-31 PROBLEM — A60.9 ANOGENITAL HERPES SIMPLEX VIRUS (HSV) INFECTION: Status: ACTIVE | Noted: 2020-11-30

## 2024-05-31 PROCEDURE — 3074F SYST BP LT 130 MM HG: CPT | Mod: CPTII,S$GLB,, | Performed by: STUDENT IN AN ORGANIZED HEALTH CARE EDUCATION/TRAINING PROGRAM

## 2024-05-31 PROCEDURE — 1159F MED LIST DOCD IN RCRD: CPT | Mod: CPTII,S$GLB,, | Performed by: STUDENT IN AN ORGANIZED HEALTH CARE EDUCATION/TRAINING PROGRAM

## 2024-05-31 PROCEDURE — 3079F DIAST BP 80-89 MM HG: CPT | Mod: CPTII,S$GLB,, | Performed by: STUDENT IN AN ORGANIZED HEALTH CARE EDUCATION/TRAINING PROGRAM

## 2024-05-31 PROCEDURE — 1160F RVW MEDS BY RX/DR IN RCRD: CPT | Mod: CPTII,S$GLB,, | Performed by: STUDENT IN AN ORGANIZED HEALTH CARE EDUCATION/TRAINING PROGRAM

## 2024-05-31 PROCEDURE — 99214 OFFICE O/P EST MOD 30 MIN: CPT | Mod: S$GLB,,, | Performed by: STUDENT IN AN ORGANIZED HEALTH CARE EDUCATION/TRAINING PROGRAM

## 2024-05-31 PROCEDURE — 3008F BODY MASS INDEX DOCD: CPT | Mod: CPTII,S$GLB,, | Performed by: STUDENT IN AN ORGANIZED HEALTH CARE EDUCATION/TRAINING PROGRAM

## 2024-05-31 PROCEDURE — 99999 PR PBB SHADOW E&M-EST. PATIENT-LVL IV: CPT | Mod: PBBFAC,,, | Performed by: STUDENT IN AN ORGANIZED HEALTH CARE EDUCATION/TRAINING PROGRAM

## 2024-05-31 RX ORDER — SCOLOPAMINE TRANSDERMAL SYSTEM 1 MG/1
1 PATCH, EXTENDED RELEASE TRANSDERMAL
Qty: 10 PATCH | Refills: 0 | Status: SHIPPED | OUTPATIENT
Start: 2024-05-31

## 2024-05-31 RX ORDER — DULOXETIN HYDROCHLORIDE 20 MG/1
20 CAPSULE, DELAYED RELEASE ORAL DAILY
Start: 2024-05-31

## 2024-05-31 NOTE — PROGRESS NOTES
Brianna Devine  1965        Subjective     Chief Complaint: F/u    History of Present Illness:  Ms. Brianna Devine is a 59 y.o. female who presents to clinic for f/u.    On Cymbalta 20 mg daily.     Has cruise coming up. Has had issues with sea sickness recently. The rocking bothers her. Has used scopolamine patches before. Asking for some for her vacation.   Dramamine just puts her to sleep.    Sees OBGYN out of [facility]. Dr. Matos. At [facility].   About to retire. Was doing mammogram Q6m+ MRI and due to dense breast tissue and family hx of breast cancer in sisters (x3) + grandmother. All ages were over 40s.  Mammogram done 11/2023.  Had pap 11/2023.    Varicose veins- sees Private Vein Doctor. Elevation helps.  Wears compression socks for travel.     Fam hx of colon cancer in father.  Had colonoscopy 11/2023. Polyp removed. Q7 yrs.   Due 11/2030.    Review of Systems   Constitutional:  Negative for chills, fever and malaise/fatigue.   HENT:  Negative for congestion.    Gastrointestinal:  Negative for abdominal pain, blood in stool, constipation and diarrhea.   Neurological:  Negative for tremors, sensory change, speech change and focal weakness.   Psychiatric/Behavioral:  The patient is not nervous/anxious.         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    11/2015 viral load negative    History of shingles     2015    Overweight (BMI 25.0-29.9) 10/29/2015    Varicose veins of both lower extremities 10/29/2015    treated at [facility]       Past Surgical History:   Procedure Laterality Date    COLONOSCOPY N/A 3/29/2016    Procedure: COLONOSCOPY;  Surgeon: Maged Lea MD;  [facility]  Service: Endoscopy;  Laterality: N/A;    COLONOSCOPY N/A 11/28/2023    Procedure: COLONOSCOPY;  Surgeon: Moses Duke MD;  Location: [facility] ENDO (4TH FLR);  Service: Endoscopy;  Laterality:  "N/A;  Suprep instr. to portal. Referral Dr. Ismael Paredes .EC  11/521-precall complete-MS    ENDOMETRIAL ABLATION      ENDOSCOPIC VEIN LASER TREATMENT Left 2017    Dr. Jake Burks/[facility]       Family History   Problem Relation Name Age of Onset    Hypertension Mother      Heart failure Father      Colon cancer Father  75        colon cancer    Breast cancer Sister      Breast cancer Sister      Other Sister          MGUS         MEDICATIONS & ALLERGIES:     Current Outpatient Medications on File Prior to Visit   Medication Sig    CALCIUM CITRATE-VITAMIN D3 ORAL Take by mouth Daily.    vitamin E acetate (VITAMIN E ORAL) Take by mouth Daily.    [DISCONTINUED] DULoxetine (CYMBALTA) 20 MG capsule TAKE 1 CAPSULE(20 MG) BY MOUTH TWICE DAILY    [DISCONTINUED] naproxen sodium (ANAPROX) 220 MG tablet Take 220 mg by mouth every 12 (twelve) hours. Prn     No current facility-administered medications on file prior to visit.       Review of patient's allergies indicates:   Allergen Reactions    Sulfa (sulfonamide antibiotics) Hives       OBJECTIVE:     Vital Signs:  Vitals:    05/31/24 0938   BP: 116/80   BP Location: Right arm   Patient Position: Sitting   BP Method: Medium (Manual)   Pulse: 67   SpO2: 98%   Weight: 81.3 kg (179 lb 3.7 oz)   Height: 5' 6.5" (1.689 m)       Body mass index is 28.5 kg/m².     Physical Exam:  Physical Exam  Vitals and nursing note reviewed.   Constitutional:       General: She is not in acute distress.     Appearance: Normal appearance. She is not ill-appearing, toxic-appearing or diaphoretic.   HENT:      Head: Normocephalic and atraumatic.   Eyes:      General: No scleral icterus.     Conjunctiva/sclera: Conjunctivae normal.   Pulmonary:      Effort: Pulmonary effort is normal. No respiratory distress.   Musculoskeletal:         General: No swelling or tenderness. Normal range of motion.      Right lower leg: No edema.      Left lower leg: No edema.   Skin:     General: Skin is " "warm and dry.      Findings: No erythema or lesion.   Neurological:      Mental Status: She is alert and oriented to person, place, and time. Mental status is at baseline.      Gait: Gait normal.   Psychiatric:         Mood and Affect: Mood normal.         Behavior: Behavior normal.            Laboratory  Lab Results   Component Value Date    GLU 83 07/03/2023     07/03/2023    K 4.6 07/03/2023     07/03/2023    CO2 25 07/03/2023    BUN 16 07/03/2023    CREATININE 0.7 07/03/2023    CALCIUM 9.2 07/03/2023     Lab Results   Component Value Date    HGBA1C 4.9 07/28/2023     No results for input(s): "POCTGLUCOSE" in the last 72 hours.        ASSESSMENT & PLAN:   Ms. Brianna Devine is a 59 y.o. female who was seen today in clinic for f/u.    1. Motion sickness, subsequent encounter  -     scopolamine (TRANSDERM-SCOP) 1.3-1.5 mg (1 mg over 3 days); Place 1 patch onto the skin every 72 hours.  Dispense: 10 patch; Refill: 0    2. Fibromyalgia  -     DULoxetine (CYMBALTA) 20 MG capsule; Take 1 capsule (20 mg total) by mouth once daily.    3. At high risk for breast cancer  -     Mammo Digital Screening Bilat w/ Matthias; Future; Expected date: 11/30/2024  -     MRI Breast w/wo Contrast, w/CAD, Bilateral; Future; Expected date: 05/31/2024    4. Family history of breast cancer in sister  -     Mammo Digital Screening Bilat w/ Matthias; Future; Expected date: 11/30/2024  -     MRI Breast w/wo Contrast, w/CAD, Bilateral; Future; Expected date: 05/31/2024    5. Family history of colon cancer    6. Encounter for screening mammogram for malignant neoplasm of breast  -     Mammo Digital Screening Bilat w/ Matthias; Future; Expected date: 11/30/2024  -     MRI Breast w/wo Contrast, w/CAD, Bilateral; Future; Expected date: 05/31/2024    7. Varicose veins of both lower extremities, unspecified whether complicated    8. Well woman exam  -     Ambulatory referral/consult to Obstetrics / Gynecology    9. Routine health " maintenance  -     CBC Auto Differential; Future; Expected date: 05/31/2024  -     Comprehensive Metabolic Panel; Future; Expected date: 05/31/2024  -     Hemoglobin A1C; Future; Expected date: 05/31/2024  -     Lipid Panel; Future; Expected date: 05/31/2024  -     TSH; Future; Expected date: 05/31/2024           Lena Guevara MD

## 2024-09-18 DIAGNOSIS — M79.7 FIBROMYALGIA: ICD-10-CM

## 2024-09-18 RX ORDER — DULOXETIN HYDROCHLORIDE 20 MG/1
20 CAPSULE, DELAYED RELEASE ORAL 2 TIMES DAILY
Qty: 180 CAPSULE | Refills: 0 | Status: SHIPPED | OUTPATIENT
Start: 2024-09-18

## 2024-09-18 NOTE — TELEPHONE ENCOUNTER
Refill Routing Note   Medication(s) are not appropriate for processing by [facility] for the following reason(s):        Required labs outdated    [facility] action(s):  Defer   Requires labs : Yes             Appointments  past 12m or future 3m with PCP    Date Provider   Last Visit   5/31/2024 Lena Guevara MD   Next Visit   Visit date not found Lena Guevara MD   ED visits in past 90 days: 0        Note composed:11:56 AM 09/18/2024

## 2024-09-18 NOTE — TELEPHONE ENCOUNTER
Care Due:                  Date            Visit Type   Department     Provider  --------------------------------------------------------------------------------                                MYCHART                              ANNUAL                              CHECKUP/PHY  [facility] PRIMARY  Last Visit: 05-      S            NAIF Guevara  Next Visit: None Scheduled  None         None Found                                                            Last  Test          Frequency    Reason                     Performed    Due Date  --------------------------------------------------------------------------------    Cr..........  12 months..  DULoxetine...............  07- 06-    [facility] Embedded Care Due Messages. Reference number: [id number].   9/18/2024 3:38:01 AM ALEX[facility]   obese abdomen

## 2024-12-02 ENCOUNTER — HOSPITAL ENCOUNTER (OUTPATIENT)
Dept: RADIOLOGY | Facility: HOSPITAL | Age: 59
Discharge: HOME | End: 2024-12-02
Attending: STUDENT IN AN ORGANIZED HEALTH CARE EDUCATION/TRAINING PROGRAM
Payer: COMMERCIAL

## 2024-12-02 VITALS — BODY MASS INDEX: 26.68 KG/M2 | WEIGHT: 170 LBS | HEIGHT: 67 IN

## 2024-12-02 DIAGNOSIS — Z91.89 AT HIGH RISK FOR BREAST CANCER: ICD-10-CM

## 2024-12-02 DIAGNOSIS — Z12.31 ENCOUNTER FOR SCREENING MAMMOGRAM FOR MALIGNANT NEOPLASM OF BREAST: ICD-10-CM

## 2024-12-02 DIAGNOSIS — Z80.3 FAMILY HISTORY OF BREAST CANCER IN SISTER: ICD-10-CM

## 2024-12-02 PROCEDURE — 77063 BREAST TOMOSYNTHESIS BI: CPT | Mod: TC,PO

## 2024-12-17 ENCOUNTER — PATIENT MESSAGE (OUTPATIENT)
Dept: PRIMARY CARE CLINIC | Facility: CLINIC | Age: 59
End: 2024-12-17
Payer: COMMERCIAL

## 2024-12-18 ENCOUNTER — PATIENT MESSAGE (OUTPATIENT)
Dept: ADMINISTRATIVE | Facility: HOSPITAL | Age: 59
End: 2024-12-18
Payer: COMMERCIAL

## 2024-12-23 ENCOUNTER — OFFICE VISIT (OUTPATIENT)
Dept: OBSTETRICS AND GYNECOLOGY | Facility: CLINIC | Age: 59
End: 2024-12-23
Payer: COMMERCIAL

## 2024-12-23 VITALS
DIASTOLIC BLOOD PRESSURE: 80 MMHG | BODY MASS INDEX: 28.34 KG/M2 | HEIGHT: 67 IN | SYSTOLIC BLOOD PRESSURE: 120 MMHG | WEIGHT: 180.56 LBS

## 2024-12-23 DIAGNOSIS — N95.1 MENOPAUSAL STATE: ICD-10-CM

## 2024-12-23 DIAGNOSIS — Z91.89 AT HIGH RISK FOR BREAST CANCER: ICD-10-CM

## 2024-12-23 DIAGNOSIS — Z01.419 WELL WOMAN EXAM WITH ROUTINE GYNECOLOGICAL EXAM: Primary | ICD-10-CM

## 2024-12-23 DIAGNOSIS — N95.1 VAGINAL DRYNESS, MENOPAUSAL: ICD-10-CM

## 2024-12-23 PROCEDURE — 3079F DIAST BP 80-89 MM HG: CPT | Mod: CPTII,S$GLB,, | Performed by: OBSTETRICS & GYNECOLOGY

## 2024-12-23 PROCEDURE — 1159F MED LIST DOCD IN RCRD: CPT | Mod: CPTII,S$GLB,, | Performed by: OBSTETRICS & GYNECOLOGY

## 2024-12-23 PROCEDURE — 99999 PR PBB SHADOW E&M-EST. PATIENT-LVL III: CPT | Mod: PBBFAC,,, | Performed by: OBSTETRICS & GYNECOLOGY

## 2024-12-23 PROCEDURE — 3074F SYST BP LT 130 MM HG: CPT | Mod: CPTII,S$GLB,, | Performed by: OBSTETRICS & GYNECOLOGY

## 2024-12-23 PROCEDURE — 99386 PREV VISIT NEW AGE 40-64: CPT | Mod: S$GLB,,, | Performed by: OBSTETRICS & GYNECOLOGY

## 2024-12-23 PROCEDURE — 3008F BODY MASS INDEX DOCD: CPT | Mod: CPTII,S$GLB,, | Performed by: OBSTETRICS & GYNECOLOGY

## 2024-12-23 RX ORDER — ESTRADIOL 0.1 MG/G
CREAM VAGINAL
Qty: 42.5 G | Refills: 3 | Status: SHIPPED | OUTPATIENT
Start: 2024-12-23

## 2024-12-23 RX ORDER — VALACYCLOVIR HYDROCHLORIDE 1 G/1
1000 TABLET, FILM COATED ORAL
COMMUNITY
Start: 2024-11-15

## 2024-12-23 NOTE — PROGRESS NOTES
Subjective     Patient ID: Brianna Devine is a 59 y.o. female.    Chief Complaint:  Annual Exam      History of Present Illness  59 y.o.  presents for annual exam.  Overall doing well. Menopausal since ~54yo.  H/o ablation prior to that.  Never on HRT.  Sexually active without complaints, except does notice vaginal dryness.  No other complaints today.  H/o HSV, last outbreak years ago, has valtrex but does not take regularly.  MMG and colonoscopy up to date.  Last pap  nml, HPV neg.     Gynecologic Exam  The patient's pertinent negatives include no genital itching, genital lesions, genital odor, genital rash, missed menses, pelvic pain, vaginal bleeding or vaginal discharge. She is not pregnant. Associated symptoms include constipation (occ). Pertinent negatives include no abdominal pain, anorexia, back pain, chills, diarrhea, discolored urine, dysuria, fever, flank pain, frequency, headaches, hematuria, nausea, painful intercourse, rash, urgency or vomiting. There has been no bleeding. She has not been passing clots. She has not been passing tissue. Nothing aggravates the symptoms. She has tried nothing for the symptoms. She is sexually active. No, her partner does not have an STD. She uses nothing for contraception. She is postmenopausal. Her past medical history is significant for a gynecological surgery and herpes simplex. There is no history of an abdominal surgery, a  section, an ectopic pregnancy, endometriosis, menorrhagia, metrorrhagia, miscarriage, ovarian cysts, perineal abscess, PID, an STD, a terminated pregnancy or vaginosis.         GYN & OB History  No LMP recorded. Patient is postmenopausal.   Date of Last Pap: 2023    OB History    Para Term  AB Living   2 2 2         SAB IAB Ectopic Multiple Live Births                  # Outcome Date GA Lbr Laz/2nd Weight Sex Type Anes PTL Lv   2 Term            1 Term                Review of Systems  Review of  "Systems   Constitutional:  Negative for chills and fever.   Respiratory:  Negative for shortness of breath.    Cardiovascular:  Negative for chest pain.   Gastrointestinal:  Positive for constipation (occ). Negative for abdominal pain, anorexia, diarrhea, nausea and vomiting.   Genitourinary:  Positive for vaginal dryness. Negative for dysuria, flank pain, frequency, hematuria, menorrhagia, missed menses, pelvic pain, urgency and vaginal discharge.   Musculoskeletal:  Negative for back pain.   Integumentary:  Negative for rash.   Neurological:  Negative for headaches.          Objective   Physical Exam    /80   Ht 5' 7.01" (1.702 m)   Wt 81.9 kg (180 lb 8.9 oz)   BMI 28.27 kg/m²     Gen: NAD  Resp: Normal respiratory effort  Breast: Symmetric, nontender.  No masses.  No skin changes.  No nipple discharge.   Abd: soft, NT  Pelvic: Normal-appearing external female genitalia.  No lesions seen.  Hypoestrogenized vaginal tissue noted.  Uterus small, mobile, nontender.  No adnexal masses or tenderness.    Ext: normal ROM  Psych: appropriate affect  Neuro: grossly intact         Assessment and Plan     Brianna was seen today for annual exam.    Diagnoses and all orders for this visit:    Well woman exam with routine gynecological exam    At high risk for breast cancer  -     Ambulatory referral/consult to [facility] ([facility]); Future    Menopausal state    Vaginal dryness, menopausal  -     estradioL (ESTRACE) 0.01 % (0.1 mg/gram) vaginal cream; Place pea-sized amount vaginally with finger every night for 3 weeks, then 2-3 times per week.          Plan:  Routine annual exam with gyn and breast exam today.  Pap up to date.   Declined STD screening.  MMG and colonoscopy up to date.  Dexa with PCP.   Estrace cream for vaginal dryness.   Will refer to [facility] due to TC score > 20%.    Counseling done, precautions given, all questions answered.  RTC 1 year for annual, or prn.            "

## 2025-01-18 DIAGNOSIS — M79.7 FIBROMYALGIA: ICD-10-CM

## 2025-01-18 NOTE — TELEPHONE ENCOUNTER
Care Due:                  Date            Visit Type   Department     Provider  --------------------------------------------------------------------------------                                MYCHART                              ANNUAL                              CHECKUP/PHY  [facility]  Last Visit: 05-      S            NAIF Guevara  Next Visit: None Scheduled  None         None Found                                                            Last  Test          Frequency    Reason                     Performed    Due Date  --------------------------------------------------------------------------------    Cr..........  12 months..  DULoxetine...............  07- 06-    [facility] Embedded Care Due Messages. Reference number: [id number].   1/18/2025 3:38:00 AM CST

## 2025-01-19 RX ORDER — DULOXETIN HYDROCHLORIDE 20 MG/1
20 CAPSULE, DELAYED RELEASE ORAL 2 TIMES DAILY
Qty: 180 CAPSULE | Refills: 0 | Status: SHIPPED | OUTPATIENT
Start: 2025-01-19

## 2025-01-19 NOTE — TELEPHONE ENCOUNTER
Refill Routing Note   Medication(s) are not appropriate for processing by [facility] for the following reason(s):        Required labs outdated    [facility] action(s):  Defer     Requires labs : Yes             Appointments  past 12m or future 3m with PCP    Date Provider   Last Visit   5/31/2024 Lena Guevara MD   Next Visit   Visit date not found Lena Guevara MD   ED visits in past 90 days: 0        Note composed:8:12 AM 01/19/2025

## 2025-06-12 DIAGNOSIS — M79.7 FIBROMYALGIA: ICD-10-CM

## 2025-06-13 ENCOUNTER — PATIENT MESSAGE (OUTPATIENT)
Dept: PRIMARY CARE CLINIC | Facility: CLINIC | Age: 60
End: 2025-06-13
Payer: COMMERCIAL

## 2025-06-13 RX ORDER — DULOXETIN HYDROCHLORIDE 20 MG/1
20 CAPSULE, DELAYED RELEASE ORAL DAILY
Qty: 180 CAPSULE | Refills: 0 | Status: SHIPPED | OUTPATIENT
Start: 2025-06-13

## 2025-06-13 RX ORDER — DULOXETIN HYDROCHLORIDE 20 MG/1
20 CAPSULE, DELAYED RELEASE ORAL
Qty: 90 CAPSULE | Refills: 0 | OUTPATIENT
Start: 2025-06-13

## 2025-06-13 NOTE — TELEPHONE ENCOUNTER
Refill Routing Note   Medication(s) are not appropriate for processing by [facility] for the following reason(s):        Required labs outdated    [facility] action(s):  Defer   Requires labs : Yes      Medication Therapy Plan: FOVS      Appointments  past 12m or future 3m with PCP    Date Provider   Last Visit   5/31/2024 Lena Guevara MD   Next Visit   8/15/2025 Lena Guevara MD   ED visits in past 90 days: 0        Note composed:6:59 AM 06/13/2025

## 2025-06-13 NOTE — TELEPHONE ENCOUNTER
Provider Staff:  Action required for this patient    Requires appointment      Please see care gap opportunities below in Care Due Message.    Thanks!  [facility]     Appointments      Date Provider   Last Visit   5/31/2024 Lena Guevara MD   Next Visit   6/12/2025 Lena Guevara MD     Refill Decision Note   Brianna JoshiSybil  is requesting a refill authorization.  Brief Assessment and Rationale for Refill:  Quick Discontinue     Medication Therapy Plan:  FOVS;     DULOXITINE 20DR 1 TIME DAILY WAS DISCONTINUED ON 09/18/24; PT NOW TAKES 20MG 2 TIMESDAILY      Comments:     Note composed:7:02 AM 06/13/2025

## 2025-06-13 NOTE — TELEPHONE ENCOUNTER
Care Due:                  Date            Visit Type   Department     Provider  --------------------------------------------------------------------------------                                MYCHART                              ANNUAL                              CHECKUP/PHY  [facility]  Last Visit: 05-      S            NAIF Guevara  Next Visit: None Scheduled  None         None Found                                                            Last  Test          Frequency    Reason                     Performed    Due Date  --------------------------------------------------------------------------------    Office Visit  15 months..  DULoxetine...............  05- 08-    Cr..........  12 months..  DULoxetine...............  07- 06-    [facility] Embedded Care Due Messages. Reference number: [id number].   6/12/2025 9:24:11 PM CDT

## 2025-06-13 NOTE — TELEPHONE ENCOUNTER
No care due was identified.  [facility] Embedded Care Due Messages. Reference number: [id number].   6/12/2025 9:26:38 PM CDT

## 2025-06-25 ENCOUNTER — OCHSNER VIRTUAL EMERGENCY DEPARTMENT (OUTPATIENT)
Facility: CLINIC | Age: 60
End: 2025-06-25
Payer: COMMERCIAL

## 2025-06-25 ENCOUNTER — OFFICE VISIT (OUTPATIENT)
Dept: INTERNAL MEDICINE | Facility: CLINIC | Age: 60
End: 2025-06-25
Payer: COMMERCIAL

## 2025-06-25 ENCOUNTER — PATIENT OUTREACH (OUTPATIENT)
Facility: OTHER | Age: 60
End: 2025-06-25
Payer: COMMERCIAL

## 2025-06-25 ENCOUNTER — NURSE TRIAGE (OUTPATIENT)
Dept: ADMINISTRATIVE | Facility: CLINIC | Age: 60
End: 2025-06-25
Payer: COMMERCIAL

## 2025-06-25 VITALS
TEMPERATURE: 97 F | OXYGEN SATURATION: 97 % | SYSTOLIC BLOOD PRESSURE: 118 MMHG | WEIGHT: 166.69 LBS | HEIGHT: 67 IN | RESPIRATION RATE: 17 BRPM | DIASTOLIC BLOOD PRESSURE: 60 MMHG | BODY MASS INDEX: 26.16 KG/M2 | HEART RATE: 76 BPM

## 2025-06-25 DIAGNOSIS — Z29.9 NEED FOR PROPHYLACTIC MEASURE: ICD-10-CM

## 2025-06-25 DIAGNOSIS — H93.11 TINNITUS AURIUM, RIGHT: ICD-10-CM

## 2025-06-25 DIAGNOSIS — R09.82 POST-NASAL DRIP: ICD-10-CM

## 2025-06-25 DIAGNOSIS — R42 DIZZINESS: Primary | ICD-10-CM

## 2025-06-25 PROCEDURE — 99214 OFFICE O/P EST MOD 30 MIN: CPT | Mod: S$GLB,,, | Performed by: NURSE PRACTITIONER

## 2025-06-25 PROCEDURE — 1160F RVW MEDS BY RX/DR IN RCRD: CPT | Mod: CPTII,S$GLB,, | Performed by: NURSE PRACTITIONER

## 2025-06-25 PROCEDURE — 3078F DIAST BP <80 MM HG: CPT | Mod: CPTII,S$GLB,, | Performed by: NURSE PRACTITIONER

## 2025-06-25 PROCEDURE — 99999 PR PBB SHADOW E&M-EST. PATIENT-LVL IV: CPT | Mod: PBBFAC,,, | Performed by: NURSE PRACTITIONER

## 2025-06-25 PROCEDURE — 3008F BODY MASS INDEX DOCD: CPT | Mod: CPTII,S$GLB,, | Performed by: NURSE PRACTITIONER

## 2025-06-25 PROCEDURE — 1159F MED LIST DOCD IN RCRD: CPT | Mod: CPTII,S$GLB,, | Performed by: NURSE PRACTITIONER

## 2025-06-25 PROCEDURE — 3074F SYST BP LT 130 MM HG: CPT | Mod: CPTII,S$GLB,, | Performed by: NURSE PRACTITIONER

## 2025-06-25 RX ORDER — LEVOCETIRIZINE DIHYDROCHLORIDE 5 MG/1
5 TABLET, FILM COATED ORAL NIGHTLY
Qty: 30 TABLET | Refills: 2 | Status: SHIPPED | OUTPATIENT
Start: 2025-06-25

## 2025-06-25 RX ORDER — FLUTICASONE PROPIONATE 50 MCG
1 SPRAY, SUSPENSION (ML) NASAL 2 TIMES DAILY
Qty: 16 G | Refills: 0 | Status: SHIPPED | OUTPATIENT
Start: 2025-06-25

## 2025-06-25 RX ORDER — AZITHROMYCIN 250 MG/1
TABLET, FILM COATED ORAL
Qty: 6 TABLET | Refills: 0 | Status: SHIPPED | OUTPATIENT
Start: 2025-06-25 | End: 2025-06-30

## 2025-06-25 NOTE — TELEPHONE ENCOUNTER
Brianna c/o intermittent episodes of dizziness since this morning and headache behind right eye x 2 days. Headache is present now. Pt feels that symptoms are sinus related and request Rx for symptoms prior to leaving to go out of town Friday morning. Does not want to go to ED or UC. Advised pt per triage protocol to go to ED/UC now (or to office with PCP approval). Referred to [facility]. Advised Brianna per Dr. Demi Bush, [facility] O[facility] - recommend patient be seen by a provider in person today by to make sure her neurologic exam is normal. Brianna v/u. Appt scheduled for 2:20 pm today with MATTHEW Griffin at [facility]. Home care and call back instructions provided for the mean time. V/u.   Reason for Disposition   New-onset headache and age > 50 years   Spinning or tilting sensation (vertigo) present now and one or more stroke risk factors (i.e., hypertension, diabetes mellitus, prior stroke/TIA, heart attack, age over 60) (Exception: Prior physician evaluation for this AND no different/worse than usual.)    Additional Information   Negative: SEVERE difficulty breathing (e.g., struggling for each breath, speaks in single words)   Negative: Shock suspected (e.g., cold/pale/clammy skin, too weak to stand, low BP, rapid pulse)   Negative: Difficult to awaken or acting confused (e.g., disoriented, slurred speech)   Negative: Fainted, and still feels dizzy afterwards   Negative: Overdose (accidental or intentional) of medications   Negative: New neurologic deficit that is present now: * Weakness of the face, arm, or leg on one side of the body * Numbness of the face, arm, or leg on one side of the body * Loss of speech or garbled speech   Negative: Heart beating < 50 beats per minute OR > 140 beats per minute   Negative: Sounds like a life-threatening emergency to the triager   Negative: Chest pain   Negative: Rectal bleeding, bloody stool, or tarry-black stool   Negative: Vomiting is main symptom   Negative: Diarrhea is main  symptom   Headache is main symptom   Negative: Difficult to awaken or acting confused (e.g., disoriented, slurred speech)   Negative: Weakness of the face, arm or leg on one side of the body and new-onset   Negative: Numbness of the face, arm or leg on one side of the body and new-onset   Negative: Loss of speech or garbled speech and new-onset   Negative: Passed out (e.g., fainted, lost consciousness, blacked out and was not responding)   Negative: Sounds like a life-threatening emergency to the triager   Negative: Unable to walk without falling   Negative: Stiff neck (can't touch chin to chest)   Negative: Other family members (or people in same household) with headaches and possibility of carbon monoxide exposure   Negative: SEVERE headache, states 'worst headache' of life   Negative: SEVERE headache, sudden-onset (i.e., reaching maximum intensity within seconds to 1 hour)   Negative: Severe pain in one eye   Negative: Loss of vision or double vision (Exception: Same as previously diagnosed migraines.)   Negative: Patient sounds very sick or weak to the triager   Negative: Fever > 103 F (39.4 C)   Negative: Fever > 100 F (37.8 C) and has diabetes mellitus or a weak immune system (e.g., HIV positive, cancer chemotherapy, organ transplant, splenectomy, chronic steroids)   Negative: SEVERE headache (e.g., excruciating) and has had severe headaches before   Negative: SEVERE headache and not relieved by pain meds   Negative: SEVERE headache and vomiting   Negative: SEVERE headache and fever   Negative: New-onset headache and weak immune system (e.g., HIV positive, cancer chemo, splenectomy, organ transplant, chronic steroids)   Negative: Fever present > 3 days (72 hours)   Negative: Patient wants to be seen   Negative: MODERATE headache (e.g., interferes with normal activities) present > 24 hours and unexplained   Negative: MILD - MODERATE headache present > 3 days (72 hours)   Negative: SEVERE dizziness (e.g., unable  to stand, requires support to walk, feels like passing out now)   Negative: SEVERE headache or neck pain    Protocols used: Dizziness-A-OH, Headache-A-OH

## 2025-06-25 NOTE — PLAN OF CARE-OVED
[facility] Emergency Department Plan of Care Note  Referral Source: Nurse On-Call                               Chief Complaint   Patient presents with    Eye Problem   c/o intermittent episodes of dizziness since this morning and headache behind right eye x 2 days. Headache is present now. Pt feels that symptoms are sinus related and request Rx for symptoms prior to leaving to go out of town Friday morning.     Recommendation: Primary Care             I recommend that she be seen in person for a neuro exam before attributing this to sinus issues.               No diagnosis found.

## 2025-06-25 NOTE — PROGRESS NOTES
"Patient spoke to [facility] RN on call nurse to report the following, "Brianna c/o intermittent episodes of dizziness since this morning and headache behind right eye x 2 days. Headache is present now. Pt feels that symptoms are sinus related and request Rx for symptoms prior to leaving to go out of town Friday morning. Does not want to go to ED or UC."    [facility] RN on call nurse consulted with [facility] MD on call, Dr. Demi Crowe, and disposition is primary care. [facility] nurse scheduled patient an appointment today with primary care at 2:20 p.m. Follow up scheduled on 06/26/2025 to outreach to assess for any additional needs/concerns.     "

## 2025-06-26 PROBLEM — H93.11 TINNITUS AURIUM, RIGHT: Status: ACTIVE | Noted: 2025-06-26

## 2025-06-26 PROBLEM — R09.82 POST-NASAL DRIP: Status: ACTIVE | Noted: 2025-06-26

## 2025-06-26 NOTE — PROGRESS NOTES
"Subjective:       Patient ID: Brianna Devine is a 60 y.o. female.    Chief Complaint: Dizziness and Pressure Behind the Eyes    History of Present Illness    CHIEF COMPLAINT:  Patient presents today with pressure headache and dizziness.    HISTORY OF PRESENT ILLNESS:  She reports a pressure headache behind her right eye that started two nights ago. The headache initially occurred at night, briefly resolved yesterday, then recurred last night. This morning she experienced dizziness and foggy sensation, which she describes as more intense than usual, feeling like she is "waiting to fall". She denies any falls. She also reports nasal drip with right-sided ear ringing. She identifies dizziness as her most concerning current symptom.    REVIEW OF SYSTEMS:  She denies fever, body aches, chills, chest pain, shortness of breath, palpitations, nausea, and right-sided ear ringing.    ALLERGIES:  She has an allergy to Sulfa medication.    ROS:  Constitutional: -fevers, -chills, +dizziness  Head: +head pain, +headaches  ENT: +tinnitus, +sinus pressure, +post nasal drip  Respiratory: -shortness of breath  Cardiovascular: -chest pain, -palpitations  Gastrointestinal: -nausea    Objective:      Physical Exam  Vitals reviewed.   Constitutional:       Appearance: Normal appearance.   HENT:      Head: Normocephalic and atraumatic.      Right Ear: Tympanic membrane normal.      Left Ear: Tympanic membrane normal.      Nose: Congestion and rhinorrhea present.      Right Sinus: Maxillary sinus tenderness and frontal sinus tenderness present.   Eyes:      Conjunctiva/sclera: Conjunctivae normal.      Pupils: Pupils are equal, round, and reactive to light.   Cardiovascular:      Rate and Rhythm: Normal rate and regular rhythm.   Pulmonary:      Effort: Pulmonary effort is normal.      Breath sounds: Normal breath sounds.   Abdominal:      General: Bowel sounds are normal.      Palpations: Abdomen is soft.   Musculoskeletal:    "      General: Normal range of motion.      Cervical back: Normal range of motion and neck supple.   Skin:     General: Skin is warm.   Neurological:      General: No focal deficit present.   Psychiatric:         Mood and Affect: Mood normal.         Assessment:       1. Dizziness  - fluticasone propionate (FLONASE) 50 mcg/actuation nasal spray; 1 spray (50 mcg total) by Each Nostril route 2 (two) times a day.  Dispense: 16 g; Refill: 0  - levocetirizine (XYZAL) 5 MG tablet; Take 1 tablet (5 mg total) by mouth every evening.  Dispense: 30 tablet; Refill: 2    2. Tinnitus aurium, right  - fluticasone propionate (FLONASE) 50 mcg/actuation nasal spray; 1 spray (50 mcg total) by Each Nostril route 2 (two) times a day.  Dispense: 16 g; Refill: 0  - levocetirizine (XYZAL) 5 MG tablet; Take 1 tablet (5 mg total) by mouth every evening.  Dispense: 30 tablet; Refill: 2    3. Post-nasal drip  - fluticasone propionate (FLONASE) 50 mcg/actuation nasal spray; 1 spray (50 mcg total) by Each Nostril route 2 (two) times a day.  Dispense: 16 g; Refill: 0  - levocetirizine (XYZAL) 5 MG tablet; Take 1 tablet (5 mg total) by mouth every evening.  Dispense: 30 tablet; Refill: 2    4. Need for prophylactic measure  - azithromycin (Z-VERONICA) 250 MG tablet; Take 2 tablets by mouth on day 1; Take 1 tablet by mouth on days 2-5  Dispense: 6 tablet; Refill: 0      Plan:       Assessment & Plan    IMPRESSION:  Assessed symptoms of pressure headache, dizziness, and ear ringing, likely due to sinus pressure and buildup.  Determined conservative management with nasal spray and antihistamine as initial approach.  Provided prophylactic antibiotic prescription for potential sinus infection development during upcoming travel.    PLAN SUMMARY:  - Flonase nasal spray twice daily, reducing to daily as symptoms improve  - OTC antihistamine (Allegra, Claritin, or Zyrtec) as directed  - Warm compresses to face between Flonase applications  - Azithromycin  (Z-Piyush) prescribed, to be used if symptoms persist or worsen after 7-10 days  - Follow-up if symptoms do not improve or worsen after 7-10 days    ACUTE MAXILLARY SINUSITIS:  - Patient presents with pressure headache behind right eye, dizziness, and right-sided facial tenderness due to sinus pressure and buildup.  - Explained that sinus swelling causes rhinorrhea and pressure that affects the balance system, resulting in dizziness.  - Discussed how blocked sinuses can lead to bacterial infection.  - Treatment plan includes: 1) Flonase nasal spray twice daily initially, then daily as symptoms improve to reduce swelling and promote drainage; 2) OTC antihistamine (Allegra, Claritin, or Zyrtec) as directed to reduce drainage; 3) Warm compresses to face between Flonase applications to help open sinuses; 4) Azithromycin (Z-Piyush) prescribed but to be used only if symptoms persist or worsen after 7-10 days from onset. Pt will be traveling for the next few weeks.     Follow up PRN.     This note was generated with the assistance of ambient listening technology. Verbal consent was obtained by the patient and accompanying visitor(s) for the recording of patient appointment to facilitate this note. I attest to having reviewed and edited the generated note for accuracy, though some syntax or spelling errors may persist. Please contact the author of this note for any clarification.

## 2025-07-15 ENCOUNTER — PATIENT OUTREACH (OUTPATIENT)
Facility: OTHER | Age: 60
End: 2025-07-15
Payer: COMMERCIAL

## 2025-07-15 NOTE — PROGRESS NOTES
Attempted to follow-up with pt today, after speaking with OOC line and consulting Catalina on 6/25, but received no answer. Pt was left a VM and was asked to return the call. Next follow-up scheduled for 7/17/25 to address needs/concerns pt may have.     Lety Rivas  ED Navigator  [phone]

## 2025-07-24 ENCOUNTER — PATIENT MESSAGE (OUTPATIENT)
Dept: PRIMARY CARE CLINIC | Facility: CLINIC | Age: 60
End: 2025-07-24
Payer: COMMERCIAL

## 2025-07-24 DIAGNOSIS — B00.1 COLD SORE: Primary | ICD-10-CM

## 2025-07-25 ENCOUNTER — E-VISIT (OUTPATIENT)
Dept: PRIMARY CARE CLINIC | Facility: CLINIC | Age: 60
End: 2025-07-25
Payer: COMMERCIAL

## 2025-07-25 DIAGNOSIS — B00.9 HSV INFECTION: Primary | ICD-10-CM

## 2025-07-25 RX ORDER — VALACYCLOVIR HYDROCHLORIDE 1 G/1
TABLET, FILM COATED ORAL
Qty: 15 TABLET | Refills: 3 | Status: SHIPPED | OUTPATIENT
Start: 2025-07-25

## 2025-07-25 NOTE — PROGRESS NOTES
Patient ID: Brianna Devine is a 60 y.o. female.        E-Visit Time Tracking:             Chief Complaint: Medication Management (Entered automatically based on patient selection in [employer].)      The patient initiated a request through [employer] on 7/25/2025 for evaluation and management with a chief complaint of Medication Management (Entered automatically based on patient selection in [employer].)     I evaluated the questionnaire submission on 07/25/2025.    [facility] Evisit Medication    7/25/2025  8:12 AM CDT - Filed by Patient   Do you agree to participate in an E-Visit? Yes   If you have any of the following symptoms, please present to your local emergency room or call 911:  I acknowledge   Medication requests for narcotics will not be addressed via an E-Visit.  Please schedule an appointment. I acknowledge   Choose the state of your primary residence [place]   Do you want to address a new or existing medication? (Start a new medication, Address a current medication) Address a current medication   What is the main issue you would like addressed today? i need a refill called in for my valtrex   Would you like to change or continue your medication? (Change medication, Continue medication) Continue medication   What is the name of the medication you would like to continue? VAltrex   Are you taking your medication as prescribed? Yes   Which option below best describes the reason for your request? (Renew refills, Prior authorization is required) Renew refills    What medical condition is the  medication intended to treat? herpes virus   Has the medication helped your condition? (Yes, No, Not sure) Yes   Have you experienced any side effects from the medication? No   Provide any information you feel is important to your history not asked above i have an existing prescription at [facility] that needs tob e refilled   Please attach any relevant images or files    Are you able to take your vitals? No         No  diagnosis found.     No orders of the defined types were placed in this encounter.           No follow-ups on file.

## 2025-07-29 DIAGNOSIS — R42 DIZZINESS: ICD-10-CM

## 2025-07-29 DIAGNOSIS — H93.11 TINNITUS AURIUM, RIGHT: ICD-10-CM

## 2025-07-29 DIAGNOSIS — R09.82 POST-NASAL DRIP: ICD-10-CM

## 2025-07-29 RX ORDER — FLUTICASONE PROPIONATE 50 MCG
1 SPRAY, SUSPENSION (ML) NASAL 2 TIMES DAILY
Qty: 16 G | Refills: 0 | Status: SHIPPED | OUTPATIENT
Start: 2025-07-29

## 2025-07-29 NOTE — TELEPHONE ENCOUNTER
Refill Routing Note   Medication(s) are not appropriate for processing by [facility] for the following reason(s):        New or recently adjusted medication  No active prescription written by provider    [facility] action(s):  Defer   Requires labs : Yes             Appointments  past 12m or future 3m with PCP    Date Provider   Last Visit   7/25/2025 Lena Guevara MD   Next Visit   8/15/2025 Lena Guevara MD   ED visits in past 90 days: 0        Note composed:1:01 PM 07/29/2025

## 2025-07-29 NOTE — TELEPHONE ENCOUNTER
Care Due:                  Date            Visit Type   Department     Provider  --------------------------------------------------------------------------------                                MYCHART                              ANNUAL                              CHECKUP/PHY  [facility]  Last Visit: 05-      S            NAIF Guevara                               -                              PRIMARY      [facility]  Next Visit: 08-      CARE ([facility])   NAIF Guevara                                                            Last  Test          Frequency    Reason                     Performed    Due Date  --------------------------------------------------------------------------------    CBC.........  12 months..  valACYclovir.............  Not Found    Overdue    Cr..........  12 months..  DULoxetine, valACYclovir.  Not Found    Overdue    [employer] Embedded Care Due Messages. Reference number: [id number].   7/29/2025 12:23:19 PM CDT

## 2025-08-11 PROBLEM — R42 DIZZINESS: Status: RESOLVED | Noted: 2025-06-25 | Resolved: 2025-08-11

## 2025-08-12 ENCOUNTER — TELEPHONE (OUTPATIENT)
Dept: PRIMARY CARE CLINIC | Facility: CLINIC | Age: 60
End: 2025-08-12
Payer: COMMERCIAL

## 2025-08-12 ENCOUNTER — LAB VISIT (OUTPATIENT)
Dept: LAB | Facility: HOSPITAL | Age: 60
End: 2025-08-12
Attending: STUDENT IN AN ORGANIZED HEALTH CARE EDUCATION/TRAINING PROGRAM
Payer: COMMERCIAL

## 2025-08-12 DIAGNOSIS — Z00.00 ANNUAL PHYSICAL EXAM: ICD-10-CM

## 2025-08-12 DIAGNOSIS — M19.90 OSTEOARTHRITIS, UNSPECIFIED OSTEOARTHRITIS TYPE, UNSPECIFIED SITE: ICD-10-CM

## 2025-08-12 DIAGNOSIS — R93.5 ABNORMAL X-RAY OF ABDOMEN: ICD-10-CM

## 2025-08-12 DIAGNOSIS — R53.83 FATIGUE, UNSPECIFIED TYPE: ICD-10-CM

## 2025-08-12 LAB
ABSOLUTE EOSINOPHIL (OHS): 0.07 K/UL
ABSOLUTE MONOCYTE (OHS): 0.42 K/UL (ref 0.3–1)
ABSOLUTE NEUTROPHIL COUNT (OHS): 2.46 K/UL (ref 1.8–7.7)
ALBUMIN SERPL BCP-MCNC: 3.7 G/DL (ref 3.5–5.2)
ALP SERPL-CCNC: 64 UNIT/L (ref 40–150)
ALT SERPL W/O P-5'-P-CCNC: 16 UNIT/L (ref 0–55)
ANION GAP (OHS): 8 MMOL/L (ref 8–16)
AST SERPL-CCNC: 28 UNIT/L (ref 0–50)
BASOPHILS # BLD AUTO: 0.03 K/UL
BASOPHILS NFR BLD AUTO: 0.6 %
BILIRUB SERPL-MCNC: 0.5 MG/DL (ref 0.1–1)
BUN SERPL-MCNC: 13 MG/DL (ref 8–23)
CALCIUM SERPL-MCNC: 8.6 MG/DL (ref 8.7–10.5)
CHLORIDE SERPL-SCNC: 107 MMOL/L (ref 95–110)
CHOLEST SERPL-MCNC: 152 MG/DL (ref 120–199)
CHOLEST/HDLC SERPL: 2.4 {RATIO} (ref 2–5)
CO2 SERPL-SCNC: 22 MMOL/L (ref 23–29)
CREAT SERPL-MCNC: 0.7 MG/DL (ref 0.5–1.4)
EAG (OHS): 85 MG/DL (ref 68–131)
ERYTHROCYTE [DISTWIDTH] IN BLOOD BY AUTOMATED COUNT: 13.9 % (ref 11.5–14.5)
GFR SERPLBLD CREATININE-BSD FMLA CKD-EPI: >60 ML/MIN/1.73/M2
GLUCOSE SERPL-MCNC: 85 MG/DL (ref 70–110)
HBA1C MFR BLD: 4.6 % (ref 4–5.6)
HCT VFR BLD AUTO: 39.4 % (ref 37–48.5)
HDLC SERPL-MCNC: 63 MG/DL (ref 40–75)
HDLC SERPL: 41.4 % (ref 20–50)
HGB BLD-MCNC: 12.9 GM/DL (ref 12–16)
IMM GRANULOCYTES # BLD AUTO: 0.01 K/UL (ref 0–0.04)
IMM GRANULOCYTES NFR BLD AUTO: 0.2 % (ref 0–0.5)
LDLC SERPL CALC-MCNC: 79.8 MG/DL (ref 63–159)
LYMPHOCYTES # BLD AUTO: 1.84 K/UL (ref 1–4.8)
MCH RBC QN AUTO: 28.9 PG (ref 27–31)
MCHC RBC AUTO-ENTMCNC: 32.7 G/DL (ref 32–36)
MCV RBC AUTO: 88 FL (ref 82–98)
NONHDLC SERPL-MCNC: 89 MG/DL
NUCLEATED RBC (/100WBC) (OHS): 0 /100 WBC
PLATELET # BLD AUTO: 256 K/UL (ref 150–450)
PMV BLD AUTO: 10.3 FL (ref 9.2–12.9)
POTASSIUM SERPL-SCNC: 4.4 MMOL/L (ref 3.5–5.1)
PROT SERPL-MCNC: 7.1 GM/DL (ref 6–8.4)
RBC # BLD AUTO: 4.46 M/UL (ref 4–5.4)
RELATIVE EOSINOPHIL (OHS): 1.4 %
RELATIVE LYMPHOCYTE (OHS): 38.1 % (ref 18–48)
RELATIVE MONOCYTE (OHS): 8.7 % (ref 4–15)
RELATIVE NEUTROPHIL (OHS): 51 % (ref 38–73)
SODIUM SERPL-SCNC: 137 MMOL/L (ref 136–145)
TRIGL SERPL-MCNC: 46 MG/DL (ref 30–150)
TSH SERPL-ACNC: 3.21 UIU/ML (ref 0.4–4)
WBC # BLD AUTO: 4.83 K/UL (ref 3.9–12.7)

## 2025-08-12 PROCEDURE — 82374 ASSAY BLOOD CARBON DIOXIDE: CPT

## 2025-08-12 PROCEDURE — 83036 HEMOGLOBIN GLYCOSYLATED A1C: CPT

## 2025-08-12 PROCEDURE — 80061 LIPID PANEL: CPT

## 2025-08-12 PROCEDURE — 84443 ASSAY THYROID STIM HORMONE: CPT

## 2025-08-12 PROCEDURE — 85025 COMPLETE CBC W/AUTO DIFF WBC: CPT

## 2025-08-12 PROCEDURE — 36415 COLL VENOUS BLD VENIPUNCTURE: CPT | Mod: PN

## 2025-08-15 ENCOUNTER — LAB VISIT (OUTPATIENT)
Dept: LAB | Facility: HOSPITAL | Age: 60
End: 2025-08-15
Attending: STUDENT IN AN ORGANIZED HEALTH CARE EDUCATION/TRAINING PROGRAM
Payer: COMMERCIAL

## 2025-08-15 ENCOUNTER — OFFICE VISIT (OUTPATIENT)
Dept: PRIMARY CARE CLINIC | Facility: CLINIC | Age: 60
End: 2025-08-15
Payer: COMMERCIAL

## 2025-08-15 VITALS
HEIGHT: 67 IN | DIASTOLIC BLOOD PRESSURE: 82 MMHG | OXYGEN SATURATION: 97 % | TEMPERATURE: 98 F | BODY MASS INDEX: 25.57 KG/M2 | SYSTOLIC BLOOD PRESSURE: 118 MMHG | HEART RATE: 82 BPM | WEIGHT: 162.94 LBS

## 2025-08-15 DIAGNOSIS — T88.7XXA MEDICATION SIDE EFFECT: ICD-10-CM

## 2025-08-15 DIAGNOSIS — Z80.0 FAMILY HISTORY OF COLON CANCER: ICD-10-CM

## 2025-08-15 DIAGNOSIS — M79.7 FIBROMYALGIA: ICD-10-CM

## 2025-08-15 DIAGNOSIS — E83.51 HYPOCALCEMIA: ICD-10-CM

## 2025-08-15 DIAGNOSIS — M81.6 LOCALIZED OSTEOPOROSIS WITHOUT CURRENT PATHOLOGICAL FRACTURE: ICD-10-CM

## 2025-08-15 DIAGNOSIS — R49.9 CHANGE IN VOICE: ICD-10-CM

## 2025-08-15 DIAGNOSIS — Z91.89 AT HIGH RISK FOR BREAST CANCER: ICD-10-CM

## 2025-08-15 DIAGNOSIS — Z12.31 ENCOUNTER FOR SCREENING MAMMOGRAM FOR MALIGNANT NEOPLASM OF BREAST: ICD-10-CM

## 2025-08-15 DIAGNOSIS — K21.9 GASTROESOPHAGEAL REFLUX DISEASE, UNSPECIFIED WHETHER ESOPHAGITIS PRESENT: ICD-10-CM

## 2025-08-15 DIAGNOSIS — Z80.3 FAMILY HISTORY OF BREAST CANCER IN SISTER: ICD-10-CM

## 2025-08-15 DIAGNOSIS — Z00.00 ANNUAL PHYSICAL EXAM: Primary | ICD-10-CM

## 2025-08-15 PROBLEM — Z28.21 VACCINATION DECLINED: Status: ACTIVE | Noted: 2025-08-15

## 2025-08-15 LAB
25(OH)D3+25(OH)D2 SERPL-MCNC: 57 NG/ML (ref 30–96)
ANION GAP (OHS): 7 MMOL/L (ref 8–16)
BUN SERPL-MCNC: 13 MG/DL (ref 8–23)
CA-I BLD-SCNC: 1.19 MMOL/L (ref 1.06–1.42)
CALCIUM SERPL-MCNC: 9.4 MG/DL (ref 8.7–10.5)
CHLORIDE SERPL-SCNC: 104 MMOL/L (ref 95–110)
CO2 SERPL-SCNC: 27 MMOL/L (ref 23–29)
CREAT SERPL-MCNC: 0.7 MG/DL (ref 0.5–1.4)
GFR SERPLBLD CREATININE-BSD FMLA CKD-EPI: >60 ML/MIN/1.73/M2
GLUCOSE SERPL-MCNC: 71 MG/DL (ref 70–110)
POTASSIUM SERPL-SCNC: 4.5 MMOL/L (ref 3.5–5.1)
SODIUM SERPL-SCNC: 138 MMOL/L (ref 136–145)

## 2025-08-15 PROCEDURE — 80048 BASIC METABOLIC PNL TOTAL CA: CPT

## 2025-08-15 PROCEDURE — 36415 COLL VENOUS BLD VENIPUNCTURE: CPT | Mod: PN

## 2025-08-15 PROCEDURE — 99999 PR PBB SHADOW E&M-EST. PATIENT-LVL IV: CPT | Mod: PBBFAC,,, | Performed by: STUDENT IN AN ORGANIZED HEALTH CARE EDUCATION/TRAINING PROGRAM

## 2025-08-15 PROCEDURE — 82306 VITAMIN D 25 HYDROXY: CPT

## 2025-08-15 PROCEDURE — 82330 ASSAY OF CALCIUM: CPT

## 2025-08-15 RX ORDER — FAMOTIDINE 20 MG/1
20 TABLET, FILM COATED ORAL DAILY
Qty: 30 TABLET | Refills: 0 | Status: SHIPPED | OUTPATIENT
Start: 2025-08-15

## 2025-08-18 PROBLEM — Z28.21 VACCINATION DECLINED: Status: RESOLVED | Noted: 2025-08-15 | Resolved: 2025-08-18
